# Patient Record
Sex: MALE | Race: WHITE | NOT HISPANIC OR LATINO | Employment: OTHER | URBAN - NONMETROPOLITAN AREA
[De-identification: names, ages, dates, MRNs, and addresses within clinical notes are randomized per-mention and may not be internally consistent; named-entity substitution may affect disease eponyms.]

---

## 2019-05-20 ENCOUNTER — APPOINTMENT (OUTPATIENT)
Dept: GENERAL RADIOLOGY | Facility: HOSPITAL | Age: 79
End: 2019-05-20

## 2019-05-20 ENCOUNTER — HOSPITAL ENCOUNTER (EMERGENCY)
Facility: HOSPITAL | Age: 79
Discharge: HOME | End: 2019-05-20
Attending: EMERGENCY MEDICINE | Admitting: EMERGENCY MEDICINE

## 2019-05-20 VITALS
RESPIRATION RATE: 20 BRPM | HEIGHT: 67 IN | HEART RATE: 100 BPM | DIASTOLIC BLOOD PRESSURE: 85 MMHG | SYSTOLIC BLOOD PRESSURE: 153 MMHG | BODY MASS INDEX: 25.9 KG/M2 | TEMPERATURE: 98.3 F | OXYGEN SATURATION: 95 % | WEIGHT: 165 LBS

## 2019-05-20 DIAGNOSIS — J44.1 COPD WITH ACUTE EXACERBATION (HCC): Primary | ICD-10-CM

## 2019-05-20 LAB
ALBUMIN SERPL-MCNC: 4.5 G/DL (ref 3.5–5)
ALBUMIN/GLOB SERPL: 1.4 G/DL (ref 1–2)
ALP SERPL-CCNC: 66 U/L (ref 38–126)
ALT SERPL W P-5'-P-CCNC: 29 U/L (ref 13–69)
ANION GAP SERPL CALCULATED.3IONS-SCNC: 15.5 MMOL/L (ref 10–20)
AST SERPL-CCNC: 35 U/L (ref 15–46)
BASOPHILS # BLD AUTO: 0.05 10*3/MM3 (ref 0–0.2)
BASOPHILS NFR BLD AUTO: 0.6 % (ref 0–1.5)
BILIRUB SERPL-MCNC: 0.6 MG/DL (ref 0.2–1.3)
BUN BLD-MCNC: 21 MG/DL (ref 7–20)
BUN/CREAT SERPL: 16.2 (ref 6.3–21.9)
CALCIUM SPEC-SCNC: 8.6 MG/DL (ref 8.4–10.2)
CHLORIDE SERPL-SCNC: 95 MMOL/L (ref 98–107)
CO2 SERPL-SCNC: 27 MMOL/L (ref 26–30)
CREAT BLD-MCNC: 1.3 MG/DL (ref 0.6–1.3)
DEPRECATED RDW RBC AUTO: 42.7 FL (ref 37–54)
EOSINOPHIL # BLD AUTO: 0.22 10*3/MM3 (ref 0–0.4)
EOSINOPHIL NFR BLD AUTO: 2.6 % (ref 0.3–6.2)
ERYTHROCYTE [DISTWIDTH] IN BLOOD BY AUTOMATED COUNT: 12.6 % (ref 12.3–15.4)
GFR SERPL CREATININE-BSD FRML MDRD: 53 ML/MIN/1.73
GLOBULIN UR ELPH-MCNC: 3.3 GM/DL
GLUCOSE BLD-MCNC: 116 MG/DL (ref 74–98)
HCT VFR BLD AUTO: 35.1 % (ref 37.5–51)
HGB BLD-MCNC: 12 G/DL (ref 13–17.7)
HOLD SPECIMEN: NORMAL
IMM GRANULOCYTES # BLD AUTO: 0.04 10*3/MM3 (ref 0–0.05)
IMM GRANULOCYTES NFR BLD AUTO: 0.5 % (ref 0–0.5)
LYMPHOCYTES # BLD AUTO: 0.74 10*3/MM3 (ref 0.7–3.1)
LYMPHOCYTES NFR BLD AUTO: 8.8 % (ref 19.6–45.3)
MCH RBC QN AUTO: 31.7 PG (ref 26.6–33)
MCHC RBC AUTO-ENTMCNC: 34.2 G/DL (ref 31.5–35.7)
MCV RBC AUTO: 92.9 FL (ref 79–97)
MONOCYTES # BLD AUTO: 0.79 10*3/MM3 (ref 0.1–0.9)
MONOCYTES NFR BLD AUTO: 9.4 % (ref 5–12)
NEUTROPHILS # BLD AUTO: 6.56 10*3/MM3 (ref 1.7–7)
NEUTROPHILS NFR BLD AUTO: 78.1 % (ref 42.7–76)
NRBC BLD AUTO-RTO: 0 /100 WBC (ref 0–0.2)
NT-PROBNP SERPL-MCNC: 332 PG/ML (ref 0–450)
PLATELET # BLD AUTO: 204 10*3/MM3 (ref 140–450)
PMV BLD AUTO: 8.2 FL (ref 6–12)
POTASSIUM BLD-SCNC: 4.5 MMOL/L (ref 3.5–5.1)
PROT SERPL-MCNC: 7.8 G/DL (ref 6.3–8.2)
RBC # BLD AUTO: 3.78 10*6/MM3 (ref 4.14–5.8)
SODIUM BLD-SCNC: 133 MMOL/L (ref 137–145)
TROPONIN I SERPL-MCNC: <0.012 NG/ML (ref 0–0.03)
WBC NRBC COR # BLD: 8.4 10*3/MM3 (ref 3.4–10.8)
WHOLE BLOOD HOLD SPECIMEN: NORMAL
WHOLE BLOOD HOLD SPECIMEN: NORMAL

## 2019-05-20 PROCEDURE — 80053 COMPREHEN METABOLIC PANEL: CPT

## 2019-05-20 PROCEDURE — 96375 TX/PRO/DX INJ NEW DRUG ADDON: CPT

## 2019-05-20 PROCEDURE — 93005 ELECTROCARDIOGRAM TRACING: CPT | Performed by: EMERGENCY MEDICINE

## 2019-05-20 PROCEDURE — 83880 ASSAY OF NATRIURETIC PEPTIDE: CPT

## 2019-05-20 PROCEDURE — 71046 X-RAY EXAM CHEST 2 VIEWS: CPT

## 2019-05-20 PROCEDURE — 25010000002 METHYLPREDNISOLONE PER 125 MG: Performed by: EMERGENCY MEDICINE

## 2019-05-20 PROCEDURE — 96365 THER/PROPH/DIAG IV INF INIT: CPT

## 2019-05-20 PROCEDURE — 99284 EMERGENCY DEPT VISIT MOD MDM: CPT

## 2019-05-20 PROCEDURE — 84484 ASSAY OF TROPONIN QUANT: CPT

## 2019-05-20 PROCEDURE — 94799 UNLISTED PULMONARY SVC/PX: CPT

## 2019-05-20 PROCEDURE — 94640 AIRWAY INHALATION TREATMENT: CPT

## 2019-05-20 PROCEDURE — 85025 COMPLETE CBC W/AUTO DIFF WBC: CPT

## 2019-05-20 PROCEDURE — 93005 ELECTROCARDIOGRAM TRACING: CPT

## 2019-05-20 PROCEDURE — 25010000002 MAGNESIUM SULFATE 2 GM/50ML SOLUTION: Performed by: EMERGENCY MEDICINE

## 2019-05-20 RX ORDER — SODIUM CHLORIDE 0.9 % (FLUSH) 0.9 %
10 SYRINGE (ML) INJECTION AS NEEDED
Status: DISCONTINUED | OUTPATIENT
Start: 2019-05-20 | End: 2019-05-20 | Stop reason: HOSPADM

## 2019-05-20 RX ORDER — MAGNESIUM SULFATE HEPTAHYDRATE 40 MG/ML
2 INJECTION, SOLUTION INTRAVENOUS ONCE
Status: COMPLETED | OUTPATIENT
Start: 2019-05-20 | End: 2019-05-20

## 2019-05-20 RX ORDER — IPRATROPIUM BROMIDE AND ALBUTEROL SULFATE 2.5; .5 MG/3ML; MG/3ML
9 SOLUTION RESPIRATORY (INHALATION) ONCE
Status: COMPLETED | OUTPATIENT
Start: 2019-05-20 | End: 2019-05-20

## 2019-05-20 RX ORDER — PREDNISONE 20 MG/1
60 TABLET ORAL DAILY
Qty: 15 TABLET | Refills: 0 | Status: SHIPPED | OUTPATIENT
Start: 2019-05-20 | End: 2019-05-25

## 2019-05-20 RX ORDER — METHYLPREDNISOLONE SODIUM SUCCINATE 125 MG/2ML
125 INJECTION, POWDER, LYOPHILIZED, FOR SOLUTION INTRAMUSCULAR; INTRAVENOUS ONCE
Status: COMPLETED | OUTPATIENT
Start: 2019-05-20 | End: 2019-05-20

## 2019-05-20 RX ORDER — DOXYCYCLINE 100 MG/1
100 CAPSULE ORAL 2 TIMES DAILY
Qty: 14 CAPSULE | Refills: 0 | Status: SHIPPED | OUTPATIENT
Start: 2019-05-20

## 2019-05-20 RX ADMIN — METHYLPREDNISOLONE SODIUM SUCCINATE 125 MG: 125 INJECTION, POWDER, FOR SOLUTION INTRAMUSCULAR; INTRAVENOUS at 13:52

## 2019-05-20 RX ADMIN — IPRATROPIUM BROMIDE AND ALBUTEROL SULFATE 9 ML: .5; 3 SOLUTION RESPIRATORY (INHALATION) at 13:27

## 2019-05-20 RX ADMIN — MAGNESIUM SULFATE HEPTAHYDRATE 2 G: 40 INJECTION, SOLUTION INTRAVENOUS at 13:54

## 2023-08-20 ENCOUNTER — HOSPITAL ENCOUNTER (EMERGENCY)
Facility: HOSPITAL | Age: 83
Discharge: HOME | End: 2023-08-20
Attending: EMERGENCY MEDICINE | Admitting: EMERGENCY MEDICINE
Payer: MEDICARE

## 2023-08-20 ENCOUNTER — APPOINTMENT (OUTPATIENT)
Dept: GENERAL RADIOLOGY | Facility: HOSPITAL | Age: 83
End: 2023-08-20
Payer: MEDICARE

## 2023-08-20 VITALS
WEIGHT: 165 LBS | HEIGHT: 67 IN | DIASTOLIC BLOOD PRESSURE: 86 MMHG | RESPIRATION RATE: 20 BRPM | BODY MASS INDEX: 25.9 KG/M2 | OXYGEN SATURATION: 100 % | HEART RATE: 67 BPM | SYSTOLIC BLOOD PRESSURE: 137 MMHG | TEMPERATURE: 97.9 F

## 2023-08-20 DIAGNOSIS — R63.0 DECREASED APPETITE: Primary | ICD-10-CM

## 2023-08-20 LAB
ALBUMIN SERPL-MCNC: 4.2 G/DL (ref 3.5–5.2)
ALBUMIN/GLOB SERPL: 1.4 G/DL
ALP SERPL-CCNC: 71 U/L (ref 39–117)
ALT SERPL W P-5'-P-CCNC: 11 U/L (ref 1–41)
ANION GAP SERPL CALCULATED.3IONS-SCNC: 7.6 MMOL/L (ref 5–15)
AST SERPL-CCNC: 15 U/L (ref 1–40)
BACTERIA UR QL AUTO: ABNORMAL /HPF
BASOPHILS # BLD AUTO: 0.07 10*3/MM3 (ref 0–0.2)
BASOPHILS NFR BLD AUTO: 1.3 % (ref 0–1.5)
BILIRUB SERPL-MCNC: 0.2 MG/DL (ref 0–1.2)
BILIRUB UR QL STRIP: NEGATIVE
BUN SERPL-MCNC: 28 MG/DL (ref 8–23)
BUN/CREAT SERPL: 23.1 (ref 7–25)
CALCIUM SPEC-SCNC: 9.3 MG/DL (ref 8.6–10.5)
CHLORIDE SERPL-SCNC: 101 MMOL/L (ref 98–107)
CLARITY UR: CLEAR
CLUMPED PLATELETS: PRESENT
CO2 SERPL-SCNC: 35.4 MMOL/L (ref 22–29)
COLOR UR: YELLOW
CREAT SERPL-MCNC: 1.21 MG/DL (ref 0.76–1.27)
DEPRECATED RDW RBC AUTO: 43.9 FL (ref 37–54)
EGFRCR SERPLBLD CKD-EPI 2021: 58.7 ML/MIN/1.73
EOSINOPHIL # BLD AUTO: 0.44 10*3/MM3 (ref 0–0.4)
EOSINOPHIL NFR BLD AUTO: 7.9 % (ref 0.3–6.2)
ERYTHROCYTE [DISTWIDTH] IN BLOOD BY AUTOMATED COUNT: 12.1 % (ref 12.3–15.4)
GLOBULIN UR ELPH-MCNC: 3.1 GM/DL
GLUCOSE SERPL-MCNC: 90 MG/DL (ref 65–99)
GLUCOSE UR STRIP-MCNC: NEGATIVE MG/DL
HCT VFR BLD AUTO: 34.8 % (ref 37.5–51)
HGB BLD-MCNC: 11.1 G/DL (ref 13–17.7)
HGB UR QL STRIP.AUTO: ABNORMAL
HOLD SPECIMEN: NORMAL
HOLD SPECIMEN: NORMAL
HYALINE CASTS UR QL AUTO: ABNORMAL /LPF
IMM GRANULOCYTES # BLD AUTO: 0.01 10*3/MM3 (ref 0–0.05)
IMM GRANULOCYTES NFR BLD AUTO: 0.2 % (ref 0–0.5)
KETONES UR QL STRIP: NEGATIVE
LEUKOCYTE ESTERASE UR QL STRIP.AUTO: NEGATIVE
LYMPHOCYTES # BLD AUTO: 1.18 10*3/MM3 (ref 0.7–3.1)
LYMPHOCYTES NFR BLD AUTO: 21.1 % (ref 19.6–45.3)
MAGNESIUM SERPL-MCNC: 2.3 MG/DL (ref 1.6–2.4)
MCH RBC QN AUTO: 31.2 PG (ref 26.6–33)
MCHC RBC AUTO-ENTMCNC: 31.9 G/DL (ref 31.5–35.7)
MCV RBC AUTO: 97.8 FL (ref 79–97)
MONOCYTES # BLD AUTO: 0.47 10*3/MM3 (ref 0.1–0.9)
MONOCYTES NFR BLD AUTO: 8.4 % (ref 5–12)
NEUTROPHILS NFR BLD AUTO: 3.43 10*3/MM3 (ref 1.7–7)
NEUTROPHILS NFR BLD AUTO: 61.1 % (ref 42.7–76)
NITRITE UR QL STRIP: NEGATIVE
NRBC BLD AUTO-RTO: 0 /100 WBC (ref 0–0.2)
PH UR STRIP.AUTO: 5.5 [PH] (ref 5–8)
PLATELET # BLD AUTO: 212 10*3/MM3 (ref 140–450)
PMV BLD AUTO: 8.7 FL (ref 6–12)
POTASSIUM SERPL-SCNC: 4.5 MMOL/L (ref 3.5–5.2)
PROT SERPL-MCNC: 7.3 G/DL (ref 6–8.5)
PROT UR QL STRIP: ABNORMAL
RBC # BLD AUTO: 3.56 10*6/MM3 (ref 4.14–5.8)
RBC # UR STRIP: ABNORMAL /HPF
RBC MORPH BLD: NORMAL
REF LAB TEST METHOD: ABNORMAL
SODIUM SERPL-SCNC: 144 MMOL/L (ref 136–145)
SP GR UR STRIP: 1.02 (ref 1–1.03)
SQUAMOUS #/AREA URNS HPF: ABNORMAL /HPF
TROPONIN T SERPL HS-MCNC: 11 NG/L
UROBILINOGEN UR QL STRIP: ABNORMAL
WBC # UR STRIP: ABNORMAL /HPF
WBC MORPH BLD: NORMAL
WBC NRBC COR # BLD: 5.6 10*3/MM3 (ref 3.4–10.8)
WHOLE BLOOD HOLD COAG: NORMAL
WHOLE BLOOD HOLD SPECIMEN: NORMAL

## 2023-08-20 PROCEDURE — 84484 ASSAY OF TROPONIN QUANT: CPT | Performed by: EMERGENCY MEDICINE

## 2023-08-20 PROCEDURE — 71045 X-RAY EXAM CHEST 1 VIEW: CPT

## 2023-08-20 PROCEDURE — 81001 URINALYSIS AUTO W/SCOPE: CPT | Performed by: EMERGENCY MEDICINE

## 2023-08-20 PROCEDURE — 85025 COMPLETE CBC W/AUTO DIFF WBC: CPT | Performed by: EMERGENCY MEDICINE

## 2023-08-20 PROCEDURE — 99284 EMERGENCY DEPT VISIT MOD MDM: CPT

## 2023-08-20 PROCEDURE — 85007 BL SMEAR W/DIFF WBC COUNT: CPT | Performed by: EMERGENCY MEDICINE

## 2023-08-20 PROCEDURE — 83735 ASSAY OF MAGNESIUM: CPT | Performed by: EMERGENCY MEDICINE

## 2023-08-20 PROCEDURE — P9612 CATHETERIZE FOR URINE SPEC: HCPCS

## 2023-08-20 PROCEDURE — 80053 COMPREHEN METABOLIC PANEL: CPT | Performed by: EMERGENCY MEDICINE

## 2023-08-20 RX ORDER — SODIUM CHLORIDE 0.9 % (FLUSH) 0.9 %
10 SYRINGE (ML) INJECTION AS NEEDED
Status: DISCONTINUED | OUTPATIENT
Start: 2023-08-20 | End: 2023-08-20 | Stop reason: HOSPADM

## 2023-08-20 RX ADMIN — SODIUM CHLORIDE 500 ML: 9 INJECTION, SOLUTION INTRAVENOUS at 16:17

## 2023-08-20 NOTE — DISCHARGE INSTRUCTIONS
Try to eat well-balanced meals throughout the day, try to encourage p.o. intake with deutsch, juices, etc.  Continue home medications as directed including inhalers.  Try to follow-up with primary care provider in the next 24 to 48 hours.  Return to the ER for any change, worsening of symptoms, or any additional concerns including but not limited to fever greater than 100.4, intractable vomiting, diarrhea, melena, hematochezia, shortness of breath, chest pain, dizziness, syncope.

## 2023-08-20 NOTE — ED PROVIDER NOTES
Subjective  History of Present Illness:    Chief Complaint: Dehydration   History of Present Illness: Patient is an 83-year-old male with history of COPD on nasal cannula presenting to the ER for possible dehydration.  When asked  what brings patient to the hospital today he states he is unsure and we would have to ask his wife.  He states he feels fine and denies any symptoms.  Per EMS, patient's wife states he has not been himself for the past few weeks and has not been eating or drinking.  They also have noted foul-smelling urine.  Patient denies any known fever, chills, worsening shortness of breath, productive cough, chest pain, dizziness, syncope, abdominal pain, dysuria, hematuria, melena, hematochezia, or any other symptoms.  Patient's daughter at bedside states patient really does not like to drink water, drinks a lot of coffee and her mother was concerned that he was getting dehydrated.  Onset: Days to weeks   Duration: Persistent  Exacerbating / Alleviating factors: None  Associated symptoms: None      Nurses Notes reviewed and agree, including vitals, allergies, social history and prior medical history.     REVIEW OF SYSTEMS: All systems reviewed and not pertinent unless noted.  Review of Systems      Positive for: Decreased appetite    Negative for: Fever, chills, shortness of breath, chest pain, cough, dizziness, syncope, abdominal pain, emesis, diarrhea, dysuria    Past Medical History:   Diagnosis Date    COPD (chronic obstructive pulmonary disease)        Allergies:    Patient has no known allergies.      Past Surgical History:   Procedure Laterality Date    HERNIA REPAIR      TONSILLECTOMY           Social History     Socioeconomic History    Marital status:    Tobacco Use    Smoking status: Former     Types: Cigarettes     Quit date:      Years since quittin.6   Substance and Sexual Activity    Alcohol use: No    Drug use: No         History reviewed. No pertinent family  "history.    Objective  Physical Exam:  /86   Pulse 67   Temp 97.9 øF (36.6 øC) (Oral)   Resp 20   Ht 170.2 cm (67\")   Wt 74.8 kg (165 lb)   SpO2 100%   BMI 25.84 kg/mý      Physical Exam    CONSTITUTIONAL: Well developed, appears chronically ill and cachectic.  He is awake, alert, answering questions.  He is oriented to person and place, does think that it is 2022  VITAL SIGNS: per nursing, reviewed and noted  SKIN: exposed skin with no rashes, ulcerations or petechiae  EYES: Grossly EOMI, no icterus, PERRL  ENT: Normal voice.  No obvious facial asymmetry, nares patent, tongue midline  RESPIRATORY:  No increased work of breathing. No retractions.  Poor lung sounds bilaterally, no significant wheezing or stridor  CARDIOVASCULAR:  regular rate, distal pulses intact  GI: Abdomen soft, nontender  MUSCULOSKELETAL:  No tenderness. Full ROM. Strength and tone grossly normal.  no spasms.   NEUROLOGIC: Alert, oriented x 3. No gross deficits. GCS 15.   PSYCH: appropriate affect.      Procedures    ED Course:        ED Course as of 08/21/23 0002   Sun Aug 20, 2023   1628 EKG interpreted by me reveals sinus rhythm with rate of 83 bpm.  There is sinus arrhythmia.  No acute ischemic findings.  This is a normal-appearing EKG. [TB]   1706 Magnesium: 2.3 [LR]   1706 Glucose: 90 [LR]   1706 BUN(!): 28 [LR]   1706 Creatinine: 1.21 [LR]   1706 Sodium: 144 [LR]   1706 Potassium: 4.5 [LR]   1706 CO2(!): 35.4 [LR]   1706 Calcium: 9.3 [LR]   1706 Total Protein: 7.3 [LR]   1706 Albumin: 4.2 [LR]   1706 ALT (SGPT): 11 [LR]   1706 AST (SGOT): 15 [LR]   1706 Alkaline Phosphatase: 71 [LR]   1706 Total Bilirubin: 0.2 [LR]   1706 Globulin: 3.1 [LR]   1706 A/G Ratio: 1.4 [LR]   1706 BUN/Creatinine Ratio: 23.1 [LR]   1706 Anion Gap: 7.6 [LR]   1706 eGFR(!): 58.7 [LR]   1706 HS Troponin T: 11 [LR]   1706 Color, UA: Yellow [LR]   1706 Appearance, UA: Clear [LR]   1706 pH, UA: 5.5 [LR]   1706 Specific Gravity, UA: 1.024 [LR]   1706 " Glucose: Negative [LR]   1706 Ketones, UA: Negative [LR]   1706 Bilirubin, UA: Negative [LR]   1706 Blood, UA(!): Trace [LR]   1706 Protein, UA(!): 100 mg/dL (2+) [LR]   1706 Leukocytes, UA: Negative [LR]   1706 Nitrite, UA: Negative [LR]   1706 Urobilinogen, UA: 1.0 E.U./dL [LR]   1706 Magnesium: 2.3 [LR]   1718 RBC, UA(!): 0-2 [LR]   1718 WBC, UA: None Seen [LR]   1718 Bacteria, UA: None Seen [LR]   1718 Squamous Epithelial Cells, UA: 0-2 [LR]   1718 Hyaline Casts, UA: 3-6 [LR]   1718 Methodology:: Manual Light Microscopy [LR]   1755 Discussed findings with patient and daughter at bedside.  He is resting comfortably in no distress.  Believe he can be discharged at this time with close follow-up and strict return precautions. [LR]      ED Course User Index  [LR] Estella Keller PA-C  [TB] Phuong Hart MD       Lab Results (last 24 hours)       Procedure Component Value Units Date/Time    CBC & Differential [[id number]]  (Abnormal) Collected: 08/20/23 1556    Specimen: Blood Updated: 08/20/23 1604    Narrative:      The following orders were created for panel order CBC & Differential.  Procedure                               Abnormality         Status                     ---------                               -----------         ------                     CBC Auto Differential[[id number]]        Abnormal            Final result               Scan Slide[[id number]]                                       Final result                 Please view results for these tests on the individual orders.    Comprehensive Metabolic Panel [[id number]]  (Abnormal) Collected: 08/20/23 1556    Specimen: Blood Updated: 08/20/23 1622     Glucose 90 mg/dL      BUN 28 mg/dL      Creatinine 1.21 mg/dL      Sodium 144 mmol/L      Potassium 4.5 mmol/L      Chloride 101 mmol/L      CO2 35.4 mmol/L      Calcium 9.3 mg/dL      Total Protein 7.3 g/dL      Albumin 4.2 g/dL      ALT (SGPT) 11 U/L      AST (SGOT) 15 U/L      Alkaline  Phosphatase 71 U/L      Total Bilirubin 0.2 mg/dL      Globulin 3.1 gm/dL      A/G Ratio 1.4 g/dL      BUN/Creatinine Ratio 23.1     Anion Gap 7.6 mmol/L      eGFR 58.7 mL/min/1.73     Narrative:      GFR Normal >60  Chronic Kidney Disease <60  Kidney Failure <15    The GFR formula is only valid for adults with stable renal function between ages 18 and 70.    Single High Sensitivity Troponin T [[id number]]  (Normal) Collected: 08/20/23 1556    Specimen: Blood Updated: 08/20/23 1622     HS Troponin T 11 ng/L     Narrative:      High Sensitive Troponin T Reference Range:  <10.0 ng/L- Negative Female for AMI  <15.0 ng/L- Negative Male for AMI  >=10 - Abnormal Female indicating possible myocardial injury.  >=15 - Abnormal Male indicating possible myocardial injury.   Clinicians would have to utilize clinical acumen, EKG, Troponin, and serial changes to determine if it is an Acute Myocardial Infarction or myocardial injury due to an underlying chronic condition.         Magnesium [[id number]]  (Normal) Collected: 08/20/23 1556    Specimen: Blood Updated: 08/20/23 1622     Magnesium 2.3 mg/dL     CBC Auto Differential [[id number]]  (Abnormal) Collected: 08/20/23 1556    Specimen: Blood Updated: 08/20/23 1604     WBC 5.60 10*3/mm3      RBC 3.56 10*6/mm3      Hemoglobin 11.1 g/dL      Hematocrit 34.8 %      MCV 97.8 fL      MCH 31.2 pg      MCHC 31.9 g/dL      RDW 12.1 %      RDW-SD 43.9 fl      MPV 8.7 fL      Platelets 212 10*3/mm3      Neutrophil % 61.1 %      Lymphocyte % 21.1 %      Monocyte % 8.4 %      Eosinophil % 7.9 %      Basophil % 1.3 %      Immature Grans % 0.2 %      Neutrophils, Absolute 3.43 10*3/mm3      Lymphocytes, Absolute 1.18 10*3/mm3      Monocytes, Absolute 0.47 10*3/mm3      Eosinophils, Absolute 0.44 10*3/mm3      Basophils, Absolute 0.07 10*3/mm3      Immature Grans, Absolute 0.01 10*3/mm3      nRBC 0.0 /100 WBC     Scan Slide [[id number]] Collected: 08/20/23 1556    Specimen: Blood Updated: 08/20/23  1604     RBC Morphology Normal     WBC Morphology Normal     Clumped Platelets Present    Urinalysis With Microscopic If Indicated (No Culture) - Straight Cath [[id number]]  (Abnormal) Collected: 08/20/23 1658    Specimen: Urine from Straight Cath Updated: 08/20/23 1704     Color, UA Yellow     Appearance, UA Clear     pH, UA 5.5     Specific Gravity, UA 1.024     Glucose, UA Negative     Ketones, UA Negative     Bilirubin, UA Negative     Blood, UA Trace     Protein,  mg/dL (2+)     Leuk Esterase, UA Negative     Nitrite, UA Negative     Urobilinogen, UA 1.0 E.U./dL    Urinalysis, Microscopic Only - Straight Cath [[id number]]  (Abnormal) Collected: 08/20/23 1658    Specimen: Urine from Straight Cath Updated: 08/20/23 1712     RBC, UA 0-2 /HPF      WBC, UA None Seen /HPF      Bacteria, UA None Seen /HPF      Squamous Epithelial Cells, UA 0-2 /HPF      Hyaline Casts, UA 3-6 /LPF      Methodology Manual Light Microscopy             No radiology results from the last 24 hrs       MDM     Amount and/or Complexity of Data Reviewed  Clinical lab tests: reviewed  Tests in the radiology section of [facility]: reviewed  Decide to obtain previous medical records or to obtain history from someone other than the patient: yes  Independent visualization of images, tracings, or specimens: yes        Initial impression of presenting illness: Patient is an 83-year-old male presenting to the ER for evaluation of concern for dehydration.  Patient denies any symptoms but EMS states his wife said he has not been eating or drinking lately    DDX: includes but is not limited to: Dehydration, electrolyte abnormalities, underlying infection such as UTI.  He denies any shortness of breath, chest pain.  He denies any abdominal pain.    Patient arrives in overall stable condition with vitals interpreted by myself.     Pertinent features from physical exam: Patient appears cachectic, nasal cannula in place, speaking in short sentences, no  abdominal tenderness, afebrile, vital signs within normal limits.    Initial diagnostic plan: We will obtain basic labs, lipase, troponin, magnesium, chest x-ray, urinalysis.  Will give IV fluids.    Results from initial plan were reviewed and interpreted by me revealing overall stable work-up.  No signs of infectious process.  There is no elevation of troponin.  Kidney function within normal limits.  No elevation no significant focal cardiopulmonary abnormalities noted on chest x-ray.  EKG interpreted by the attending.  No leukocytosis, hemoglobin 11.1, most recent for comparison was over 4 years ago.  Urine without signs of infection.    Diagnostic information from other sources: Chart review, patient's daughter    Interventions / Re-evaluation: Patient remained stable throughout visit.  He had no complaints throughout his stay here.  He received 500 cc fluid bolus, vital signs remained stable    Results/clinical rationale were discussed with patient and daughter at bedside.  Discussed work-up and findings.  Discussed making sure he drinks plenty of fluids at home and eats well-balanced meals throughout the day.  Discussed follow-up with primary care provider and his pulmonologist.  Discussed strict return precautions to the ER.  He verbalized understanding and was in agreement with this plan of care.    Consultations/Discussion of results with other physicians: Dr. Hart    Disposition plan: Discharge  -----    Final diagnoses:   Decreased appetite          Estella Keller PA-C  08/21/23 0002

## 2024-05-12 ENCOUNTER — APPOINTMENT (OUTPATIENT)
Dept: GENERAL RADIOLOGY | Facility: HOSPITAL | Age: 84
End: 2024-05-12
Payer: MEDICARE

## 2024-05-12 ENCOUNTER — APPOINTMENT (OUTPATIENT)
Dept: CT IMAGING | Facility: HOSPITAL | Age: 84
End: 2024-05-12
Payer: MEDICARE

## 2024-05-12 ENCOUNTER — HOSPITAL ENCOUNTER (INPATIENT)
Facility: HOSPITAL | Age: 84
LOS: 3 days | Discharge: NURSING, REHAB OR LONG-TERM CARE | End: 2024-05-15
Attending: STUDENT IN AN ORGANIZED HEALTH CARE EDUCATION/TRAINING PROGRAM | Admitting: STUDENT IN AN ORGANIZED HEALTH CARE EDUCATION/TRAINING PROGRAM
Payer: MEDICARE

## 2024-05-12 DIAGNOSIS — J43.9 PULMONARY EMPHYSEMA, UNSPECIFIED EMPHYSEMA TYPE: ICD-10-CM

## 2024-05-12 DIAGNOSIS — J18.0 BRONCHOPNEUMONIA: Primary | ICD-10-CM

## 2024-05-12 DIAGNOSIS — J96.01 ACUTE HYPOXIC RESPIRATORY FAILURE: ICD-10-CM

## 2024-05-12 LAB
A-A DO2: ABNORMAL
ALBUMIN SERPL-MCNC: 3.7 G/DL (ref 3.5–5.2)
ALBUMIN/GLOB SERPL: 1.1 G/DL
ALP SERPL-CCNC: 75 U/L (ref 39–117)
ALT SERPL W P-5'-P-CCNC: 20 U/L (ref 1–41)
ANION GAP SERPL CALCULATED.3IONS-SCNC: 10 MMOL/L (ref 5–15)
ARTERIAL PATENCY WRIST A: POSITIVE
AST SERPL-CCNC: 18 U/L (ref 1–40)
ATMOSPHERIC PRESS: 732 MMHG
BASE EXCESS BLDA CALC-SCNC: 7.3 MMOL/L (ref 0–2)
BASOPHILS # BLD AUTO: 0.04 10*3/MM3 (ref 0–0.2)
BASOPHILS NFR BLD AUTO: 0.5 % (ref 0–1.5)
BDY SITE: ABNORMAL
BILIRUB SERPL-MCNC: 0.4 MG/DL (ref 0–1.2)
BUN SERPL-MCNC: 25 MG/DL (ref 8–23)
BUN/CREAT SERPL: 20.2 (ref 7–25)
CALCIUM SPEC-SCNC: 9.1 MG/DL (ref 8.6–10.5)
CHLORIDE SERPL-SCNC: 98 MMOL/L (ref 98–107)
CO2 SERPL-SCNC: 31 MMOL/L (ref 22–29)
COHGB MFR BLD: 0.9 % (ref 0–2)
CREAT SERPL-MCNC: 1.24 MG/DL (ref 0.76–1.27)
D-LACTATE SERPL-SCNC: 1.1 MMOL/L (ref 0.5–2)
DEPRECATED RDW RBC AUTO: 45.1 FL (ref 37–54)
EGFRCR SERPLBLD CKD-EPI 2021: 56.6 ML/MIN/1.73
EOSINOPHIL # BLD AUTO: 0.07 10*3/MM3 (ref 0–0.4)
EOSINOPHIL NFR BLD AUTO: 0.9 % (ref 0.3–6.2)
ERYTHROCYTE [DISTWIDTH] IN BLOOD BY AUTOMATED COUNT: 12.5 % (ref 12.3–15.4)
GAS FLOW AIRWAY: 6 LPM
GEN 5 2HR TROPONIN T REFLEX: 16 NG/L
GLOBULIN UR ELPH-MCNC: 3.3 GM/DL
GLUCOSE SERPL-MCNC: 130 MG/DL (ref 65–99)
HCO3 BLDA-SCNC: 34.1 MMOL/L (ref 22–28)
HCT VFR BLD AUTO: 32.1 % (ref 37.5–51)
HCT VFR BLD CALC: 31.3 %
HGB BLD-MCNC: 10 G/DL (ref 13–17.7)
HOLD SPECIMEN: NORMAL
HOLD SPECIMEN: NORMAL
IMM GRANULOCYTES # BLD AUTO: 0.02 10*3/MM3 (ref 0–0.05)
IMM GRANULOCYTES NFR BLD AUTO: 0.3 % (ref 0–0.5)
LYMPHOCYTES # BLD AUTO: 0.75 10*3/MM3 (ref 0.7–3.1)
LYMPHOCYTES NFR BLD AUTO: 9.5 % (ref 19.6–45.3)
Lab: ABNORMAL
MAGNESIUM SERPL-MCNC: 2.2 MG/DL (ref 1.6–2.4)
MCH RBC QN AUTO: 30.8 PG (ref 26.6–33)
MCHC RBC AUTO-ENTMCNC: 31.2 G/DL (ref 31.5–35.7)
MCV RBC AUTO: 98.8 FL (ref 79–97)
METHGB BLD QL: 0.5 % (ref 0–1.5)
MODALITY: ABNORMAL
MONOCYTES # BLD AUTO: 0.8 10*3/MM3 (ref 0.1–0.9)
MONOCYTES NFR BLD AUTO: 10.2 % (ref 5–12)
NEUTROPHILS NFR BLD AUTO: 6.19 10*3/MM3 (ref 1.7–7)
NEUTROPHILS NFR BLD AUTO: 78.6 % (ref 42.7–76)
NRBC BLD AUTO-RTO: 0 /100 WBC (ref 0–0.2)
NT-PROBNP SERPL-MCNC: 322.3 PG/ML (ref 0–1800)
OXYHGB MFR BLDV: 95.8 % (ref 94–99)
PCO2 BLDA: 59.4 MM HG (ref 35–45)
PCO2 TEMP ADJ BLD: ABNORMAL MM[HG]
PH BLDA: 7.37 PH UNITS (ref 7.35–7.45)
PH, TEMP CORRECTED: ABNORMAL
PLATELET # BLD AUTO: 182 10*3/MM3 (ref 140–450)
PMV BLD AUTO: 8.5 FL (ref 6–12)
PO2 BLDA: 91.4 MM HG (ref 75–100)
PO2 TEMP ADJ BLD: ABNORMAL MM[HG]
POTASSIUM SERPL-SCNC: 4.5 MMOL/L (ref 3.5–5.2)
PROCALCITONIN SERPL-MCNC: 0.15 NG/ML (ref 0–0.25)
PROT SERPL-MCNC: 7 G/DL (ref 6–8.5)
RBC # BLD AUTO: 3.25 10*6/MM3 (ref 4.14–5.8)
SAO2 % BLDCOA: 97.1 % (ref 94–100)
SODIUM SERPL-SCNC: 139 MMOL/L (ref 136–145)
TROPONIN T DELTA: 0 NG/L
TROPONIN T SERPL HS-MCNC: 16 NG/L
TROPONIN T SERPL HS-MCNC: 16 NG/L
VENTILATOR MODE: ABNORMAL
WBC NRBC COR # BLD AUTO: 7.87 10*3/MM3 (ref 3.4–10.8)
WHOLE BLOOD HOLD COAG: NORMAL
WHOLE BLOOD HOLD SPECIMEN: NORMAL

## 2024-05-12 PROCEDURE — 83880 ASSAY OF NATRIURETIC PEPTIDE: CPT | Performed by: STUDENT IN AN ORGANIZED HEALTH CARE EDUCATION/TRAINING PROGRAM

## 2024-05-12 PROCEDURE — 85025 COMPLETE CBC W/AUTO DIFF WBC: CPT | Performed by: STUDENT IN AN ORGANIZED HEALTH CARE EDUCATION/TRAINING PROGRAM

## 2024-05-12 PROCEDURE — 83605 ASSAY OF LACTIC ACID: CPT | Performed by: STUDENT IN AN ORGANIZED HEALTH CARE EDUCATION/TRAINING PROGRAM

## 2024-05-12 PROCEDURE — 99285 EMERGENCY DEPT VISIT HI MDM: CPT

## 2024-05-12 PROCEDURE — 84484 ASSAY OF TROPONIN QUANT: CPT | Performed by: STUDENT IN AN ORGANIZED HEALTH CARE EDUCATION/TRAINING PROGRAM

## 2024-05-12 PROCEDURE — 83050 HGB METHEMOGLOBIN QUAN: CPT

## 2024-05-12 PROCEDURE — 71275 CT ANGIOGRAPHY CHEST: CPT

## 2024-05-12 PROCEDURE — 25010000002 METHYLPREDNISOLONE PER 125 MG: Performed by: STUDENT IN AN ORGANIZED HEALTH CARE EDUCATION/TRAINING PROGRAM

## 2024-05-12 PROCEDURE — 36415 COLL VENOUS BLD VENIPUNCTURE: CPT

## 2024-05-12 PROCEDURE — 93005 ELECTROCARDIOGRAM TRACING: CPT | Performed by: STUDENT IN AN ORGANIZED HEALTH CARE EDUCATION/TRAINING PROGRAM

## 2024-05-12 PROCEDURE — 36600 WITHDRAWAL OF ARTERIAL BLOOD: CPT

## 2024-05-12 PROCEDURE — 71045 X-RAY EXAM CHEST 1 VIEW: CPT

## 2024-05-12 PROCEDURE — 83735 ASSAY OF MAGNESIUM: CPT

## 2024-05-12 PROCEDURE — 82375 ASSAY CARBOXYHB QUANT: CPT

## 2024-05-12 PROCEDURE — 94640 AIRWAY INHALATION TREATMENT: CPT

## 2024-05-12 PROCEDURE — 80053 COMPREHEN METABOLIC PANEL: CPT | Performed by: STUDENT IN AN ORGANIZED HEALTH CARE EDUCATION/TRAINING PROGRAM

## 2024-05-12 PROCEDURE — 25510000001 IOPAMIDOL 61 % SOLUTION: Performed by: STUDENT IN AN ORGANIZED HEALTH CARE EDUCATION/TRAINING PROGRAM

## 2024-05-12 PROCEDURE — 84145 PROCALCITONIN (PCT): CPT

## 2024-05-12 PROCEDURE — 82805 BLOOD GASES W/O2 SATURATION: CPT

## 2024-05-12 RX ORDER — METHYLPREDNISOLONE SODIUM SUCCINATE 125 MG/2ML
125 INJECTION, POWDER, LYOPHILIZED, FOR SOLUTION INTRAMUSCULAR; INTRAVENOUS ONCE
Status: COMPLETED | OUTPATIENT
Start: 2024-05-12 | End: 2024-05-12

## 2024-05-12 RX ORDER — ALBUTEROL SULFATE 2.5 MG/3ML
5 SOLUTION RESPIRATORY (INHALATION) ONCE
Status: COMPLETED | OUTPATIENT
Start: 2024-05-12 | End: 2024-05-12

## 2024-05-12 RX ORDER — SODIUM CHLORIDE 0.9 % (FLUSH) 0.9 %
10 SYRINGE (ML) INJECTION AS NEEDED
Status: DISCONTINUED | OUTPATIENT
Start: 2024-05-12 | End: 2024-05-13 | Stop reason: SDUPTHER

## 2024-05-12 RX ADMIN — IOPAMIDOL 100 ML: 612 INJECTION, SOLUTION INTRAVENOUS at 19:08

## 2024-05-12 RX ADMIN — IPRATROPIUM BROMIDE 1 MG: 0.5 SOLUTION RESPIRATORY (INHALATION) at 20:19

## 2024-05-12 RX ADMIN — METHYLPREDNISOLONE SODIUM SUCCINATE 125 MG: 125 INJECTION, POWDER, FOR SOLUTION INTRAMUSCULAR; INTRAVENOUS at 19:58

## 2024-05-12 RX ADMIN — ALBUTEROL SULFATE 5 MG: 2.5 SOLUTION RESPIRATORY (INHALATION) at 20:19

## 2024-05-12 NOTE — ED PROVIDER NOTES
EMERGENCY DEPARTMENT ENCOUNTER    Pt Name: Gregorio Yu  MRN: [id number]  Pt :   1940  Room Number:    Date of encounter:  2024  PCP: Xu Santos MD  ED Provider: Reynaldo Fontaine PA-C    Historian: Patient, nursing notes      HPI:  Chief Complaint: Shortness of breath        Context: Gregorio Yu is a 84 y.o. male who presents to the ED c/o increased shortness of breath for the last several days.  Patient denies any chest pain, hemoptysis, cough, fever or chills, abdominal pain, headache, dizziness, or any other complaint today.      PAST MEDICAL HISTORY  Past Medical History:   Diagnosis Date    COPD (chronic obstructive pulmonary disease)          PAST SURGICAL HISTORY  Past Surgical History:   Procedure Laterality Date    HERNIA REPAIR      TONSILLECTOMY           FAMILY HISTORY  History reviewed. No pertinent family history.      SOCIAL HISTORY  Social History     Socioeconomic History    Marital status:    Tobacco Use    Smoking status: Former     Current packs/day: 0.00     Types: Cigarettes     Quit date:      Years since quitting: 15.3   Substance and Sexual Activity    Alcohol use: No    Drug use: No         ALLERGIES  Patient has no known allergies.        REVIEW OF SYSTEMS  Review of Systems   Constitutional:  Negative for chills and fever.   HENT:  Negative for congestion and sore throat.    Respiratory:  Positive for shortness of breath and wheezing. Negative for cough.    Cardiovascular:  Negative for chest pain.   Gastrointestinal:  Negative for abdominal pain, nausea and vomiting.   Genitourinary:  Negative for dysuria.   Musculoskeletal:  Negative for back pain.   Skin:  Negative for wound.   Neurological:  Negative for dizziness, light-headedness and headaches.   Psychiatric/Behavioral:  Negative for confusion.    All other systems reviewed and are negative.         All systems reviewed and negative except for those discussed in HPI.       PHYSICAL  EXAM    I have reviewed the triage vital signs and nursing notes.    ED Triage Vitals   Temp Heart Rate Resp BP SpO2   05/12/24 1823 05/12/24 1823 05/12/24 1823 05/12/24 1824 05/12/24 1823   99.7 °F (37.6 °C) 115 20 146/77 93 %      Temp src Heart Rate Source Patient Position BP Location FiO2 (%)   05/12/24 1823 -- 05/12/24 1823 05/12/24 1823 --   Oral  Sitting Right arm        Physical Exam  Vitals and nursing note reviewed.   Constitutional:       General: He is not in acute distress.     Appearance: Normal appearance. He is not ill-appearing, toxic-appearing or diaphoretic.   HENT:      Head: Normocephalic and atraumatic.      Right Ear: External ear normal.      Left Ear: External ear normal.      Nose: No congestion or rhinorrhea.      Mouth/Throat:      Mouth: Mucous membranes are moist.      Pharynx: Oropharynx is clear.   Eyes:      Conjunctiva/sclera: Conjunctivae normal.   Cardiovascular:      Rate and Rhythm: Normal rate.      Heart sounds: Normal heart sounds.   Pulmonary:      Effort: Pulmonary effort is normal. No respiratory distress.      Breath sounds: Decreased breath sounds and wheezing present. No rhonchi or rales.   Abdominal:      Tenderness: There is no abdominal tenderness.   Musculoskeletal:      Cervical back: Normal range of motion and neck supple.      Right lower leg: No edema.      Left lower leg: No edema.   Skin:     Findings: No rash.   Neurological:      Mental Status: He is alert.             LAB RESULTS  Recent Results (from the past 24 hour(s))   High Sensitivity Troponin T    Collection Time: 05/12/24  6:28 PM    Specimen: Blood   Result Value Ref Range    HS Troponin T 16 <22 ng/L   Comprehensive Metabolic Panel    Collection Time: 05/12/24  6:28 PM    Specimen: Blood   Result Value Ref Range    Glucose 130 (H) 65 - 99 mg/dL    BUN 25 (H) 8 - 23 mg/dL    Creatinine 1.24 0.76 - 1.27 mg/dL    Sodium 139 136 - 145 mmol/L    Potassium 4.5 3.5 - 5.2 mmol/L    Chloride 98 98 - 107  mmol/L    CO2 31.0 (H) 22.0 - 29.0 mmol/L    Calcium 9.1 8.6 - 10.5 mg/dL    Total Protein 7.0 6.0 - 8.5 g/dL    Albumin 3.7 3.5 - 5.2 g/dL    ALT (SGPT) 20 1 - 41 U/L    AST (SGOT) 18 1 - 40 U/L    Alkaline Phosphatase 75 39 - 117 U/L    Total Bilirubin 0.4 0.0 - 1.2 mg/dL    Globulin 3.3 gm/dL    A/G Ratio 1.1 g/dL    BUN/Creatinine Ratio 20.2 7.0 - 25.0    Anion Gap 10.0 5.0 - 15.0 mmol/L    eGFR 56.6 (L) >60.0 mL/min/1.73   BNP    Collection Time: 05/12/24  6:28 PM    Specimen: Blood   Result Value Ref Range    proBNP 322.3 0.0 - 1,800.0 pg/mL   Single High Sensitivity Troponin T    Collection Time: 05/12/24  6:28 PM    Specimen: Blood   Result Value Ref Range    HS Troponin T 16 <22 ng/L   Green Top (Gel)    Collection Time: 05/12/24  6:28 PM   Result Value Ref Range    Extra Tube Hold for add-ons.    Lavender Top    Collection Time: 05/12/24  6:28 PM   Result Value Ref Range    Extra Tube hold for add-on    Gold Top - SST    Collection Time: 05/12/24  6:28 PM   Result Value Ref Range    Extra Tube Hold for add-ons.    Light Blue Top    Collection Time: 05/12/24  6:28 PM   Result Value Ref Range    Extra Tube Hold for add-ons.    CBC Auto Differential    Collection Time: 05/12/24  6:28 PM    Specimen: Blood   Result Value Ref Range    WBC 7.87 3.40 - 10.80 10*3/mm3    RBC 3.25 (L) 4.14 - 5.80 10*6/mm3    Hemoglobin 10.0 (L) 13.0 - 17.7 g/dL    Hematocrit 32.1 (L) 37.5 - 51.0 %    MCV 98.8 (H) 79.0 - 97.0 fL    MCH 30.8 26.6 - 33.0 pg    MCHC 31.2 (L) 31.5 - 35.7 g/dL    RDW 12.5 12.3 - 15.4 %    RDW-SD 45.1 37.0 - 54.0 fl    MPV 8.5 6.0 - 12.0 fL    Platelets 182 140 - 450 10*3/mm3    Neutrophil % 78.6 (H) 42.7 - 76.0 %    Lymphocyte % 9.5 (L) 19.6 - 45.3 %    Monocyte % 10.2 5.0 - 12.0 %    Eosinophil % 0.9 0.3 - 6.2 %    Basophil % 0.5 0.0 - 1.5 %    Immature Grans % 0.3 0.0 - 0.5 %    Neutrophils, Absolute 6.19 1.70 - 7.00 10*3/mm3    Lymphocytes, Absolute 0.75 0.70 - 3.10 10*3/mm3    Monocytes, Absolute  0.80 0.10 - 0.90 10*3/mm3    Eosinophils, Absolute 0.07 0.00 - 0.40 10*3/mm3    Basophils, Absolute 0.04 0.00 - 0.20 10*3/mm3    Immature Grans, Absolute 0.02 0.00 - 0.05 10*3/mm3    nRBC 0.0 0.0 - 0.2 /100 WBC   Magnesium    Collection Time: 05/12/24  6:28 PM    Specimen: Blood   Result Value Ref Range    Magnesium 2.2 1.6 - 2.4 mg/dL   Procalcitonin    Collection Time: 05/12/24  6:28 PM    Specimen: Blood   Result Value Ref Range    Procalcitonin 0.15 0.00 - 0.25 ng/mL   Lactic Acid, Plasma    Collection Time: 05/12/24  6:29 PM    Specimen: Blood   Result Value Ref Range    Lactate 1.1 0.5 - 2.0 mmol/L   Blood Gas, Arterial With Co-Ox    Collection Time: 05/12/24  8:17 PM    Specimen: Arterial Blood   Result Value Ref Range    Site Right Radial     Crescencio's Test Positive     pH, Arterial 7.367 7.350 - 7.450 pH units    pCO2, Arterial 59.4 (C) 35.0 - 45.0 mm Hg    pO2, Arterial 91.4 75.0 - 100.0 mm Hg    HCO3, Arterial 34.1 (H) 22.0 - 28.0 mmol/L    Base Excess, Arterial 7.3 (H) 0.0 - 2.0 mmol/L    O2 Saturation, Arterial 97.1 94.0 - 100.0 %    Hematocrit, Blood Gas 31.3 %    Oxyhemoglobin 95.8 94 - 99 %    Methemoglobin 0.50 0.00 - 1.50 %    Carboxyhemoglobin 0.9 0 - 2 %    A-a DO2      Barometric Pressure for Blood Gas 732 mmHg    Modality Nasal Cannula     Flow Rate 6.0 lpm    Ventilator Mode NA     Collected by [id number]     pH, Temp Corrected      pCO2, Temperature Corrected      pO2, Temperature Corrected         If labs were ordered, I independently reviewed the results and considered them in treating the patient.        RADIOLOGY  CT Angiogram Chest Pulmonary Embolism    Result Date: 5/12/2024  FINAL REPORT TECHNIQUE: Multiple axial CT images were obtained through the chest following IV contrast using a CTA/PE protocol.  3D/MIP reconstruction images were also performed. This study was performed with techniques to keep radiation doses as low as reasonably achievable (ALARA). Individualized dose reduction  techniques using automated exposure control or adjustment of mA and/or kV according to the patient's size were employed. CLINICAL HISTORY: shortness of breath with tachycardia, concern for PE FINDINGS: PAs and aorta: No pulmonary embolus.  Thoracic aorta is unremarkable.  There is coronary artery disease. Heart/mediastinum: No evidence for right heart strain.  No pericardial effusion.  The heart is normal in size.  Lungs: There is severe emphysema. There is bronchial wall thickening with peripheral mucous plugging and tree-in-bud nodularity in the right middle lobe and right lower lobe.  Lymph nodes: No pathologically enlarged thoracic lymph nodes.  Pleura: No pneumothorax or pleural effusion.  Chest Wall: No chest wall contusion.  Bones: No acute fracture.  Upper abdomen: No acute findings in the upper abdomen.     No pulmonary embolus.   Severe emphysema with bronchitis and bronchopneumonia. Authenticated and Electronically Signed by Kirill Blanchard MD on 05/12/2024 09:21:27 PM     I ordered and independently reviewed the above noted radiographic studies.      I viewed images of chest x-ray which showed evidence of pneumonia per my independent interpretation.    I viewed images of the CT pulmonary embolism scan which showed concerning findings for bronchopneumonia per my independent interpretation    See radiologist's dictation for official interpretation.        PROCEDURES    Procedures    ECG 12 Lead Other; SOA   Final Result          MEDICATIONS GIVEN IN ER    Medications   sodium chloride 0.9 % flush 10 mL (has no administration in time range)   sodium chloride 0.9 % flush 10 mL (has no administration in time range)   iopamidol (ISOVUE-300) 61 % injection 100 mL (100 mL Intravenous Given 5/12/24 1908)   albuterol (PROVENTIL) nebulizer solution 0.083% 2.5 mg/3mL (5 mg Nebulization Given 5/12/24 2019)   ipratropium (ATROVENT) nebulizer solution 1 mg (1 mg Nebulization Given 5/12/24 2019)   methylPREDNISolone  sodium succinate (SOLU-Medrol) injection 125 mg (125 mg Intravenous Given 5/12/24 1958)         MEDICAL DECISION MAKING, PROGRESS, and CONSULTS    All labs, if obtained, have been independently reviewed by me.  All radiology studies, if obtained, have been reviewed by me and the radiologist dictating the report.  All EKG's, if obtained, have been independently viewed and interpreted by me/my attending physician.      Discussion below represents my analysis of pertinent findings related to patient's condition, differential diagnosis, treatment plan and final disposition.    84-year-old male with history of COPD presenting to the emergency department for increased shortness of breath.  Patient normally wears 3 L nasal cannula at all times at home he was found at home wearing his 3 L of oxygen satting in the low 80s.  Here in the emergency department he is requiring 5 to 6 L nasal cannula to maintain low 90s oxygen saturations.  DuoNebs were given along with Solu-Medrol.  Laboratory workup showed no leukocytosis.  CMP was notable for a CO2 of 31.  ABG showed pCO2 of 59.4, pH of 7.36 I do not suspect this patient requires BiPAP at this time.  Procalcitonin magnesium and troponin were all within normal limits.  Lactic acid was normal.  On reevaluation patient is still requiring high oxygen higher than normal oxygen requirement to maintain his O2 saturation around 93%.  Given this acute hypoxic respiratory failure I contacted hospital medicine Dr. Kerley who agreed accept the patient to [facility]                     Differential diagnosis:    Differential diagnosis included but was not limited to acute hypoxic respiratory failure, pneumonia, PE, COPD exacerbation, among others      Additional sources:    - Discussed/ obtained information from independent historians: None available    - External (non-ED) record review: Extensive primary care and previous hospital admission record review    - Chronic  or social conditions impacting care: None    Orders placed during this visit:  Orders Placed This Encounter   Procedures    XR Chest 1 View    CT Angiogram Chest Pulmonary Embolism    High Sensitivity Troponin T    [place] Draw    Comprehensive Metabolic Panel    BNP    Single High Sensitivity Troponin T    CBC Auto Differential    Lactic Acid, Plasma    Magnesium    Procalcitonin    High Sensitivity Troponin T 2Hr    Blood Gas, Arterial -With Co-Ox Panel: Yes    Blood Gas, Arterial With Co-Ox    NPO Diet NPO Type: Strict NPO    Undress & Gown    Continuous Pulse Oximetry    Vital Signs    Oxygen Therapy- Nasal Cannula; Titrate 1-6 LPM Per SpO2; 90 - 95%    ECG 12 Lead Other; SOA    Insert peripheral IV    Insert Peripheral IV    Inpatient Admission    CBC & Differential    Green Top (Gel)    Lavender Top    Gold Top - SST    Light Blue Top         Additional orders considered but not ordered: None      ED Course:    Consultants: Hospital medicine Dr. Kerley    ED Course as of 05/12/24 2209   Sun May 12, 2024   2029 I have interviewed and examined the patient FACE-TO-FACE.  I reviewed the mid-level provider(s) note and agree with the clinical impression, plan, and disposition unless otherwise stated in the MDM below.    ATTENDING ATTESTATION  HPI: 84-year-old male with past medical history of COPD on baseline 3 L nasal cannula who presents with increasing shortness of breath, cough, generalized weakness for the past several days.  Patient is accompanied by his daughter who contributes to HPI.    EXAM:   General: Alert.  Ill-appearing, but nontoxic.  No acute distress.  Head: Normocephalic.  Atraumatic.  Eyes: No scleral icterus.  Cardiovascular: Regular rate and rhythm.  No murmurs.  No rubs.  2+ distal pulses bilaterally.  Respiratory: Decreased air entry bilateral bases.  No rales.  No rhonchi.  No respiratory distress.  GI: Abdomen is soft.  Nondistended.  Nontender to palpation.  No rebound.  No guarding.  No  CVA tenderness.  Neurologic: Oriented x 3.  No focal deficits.  Skin: No erythema.  No edema. No pallor. No cyanosis.       [JS]      ED Course User Index  [JS] Matthias Guillen DO              Shared Decision Making:  After my consideration of clinical presentation and any laboratory/radiology studies obtained, I discussed the findings with the patient/patient representative who is in agreement with the treatment plan and the final disposition.   Risks and benefits of discharge and/or observation/admission were discussed.       AS OF 22:09 EDT VITALS:    BP - 123/53  HR - 93  TEMP - 99.7 °F (37.6 °C) (Oral)  O2 SATS - 93%                  DIAGNOSIS  Final diagnoses:   Bronchopneumonia   Acute hypoxic respiratory failure         DISPOSITION  Admit      Please note that portions of this document were completed with voice recognition software.      Reynalod Fontaine PA-C  05/12/24 0500

## 2024-05-13 LAB
ANION GAP SERPL CALCULATED.3IONS-SCNC: 8.6 MMOL/L (ref 5–15)
BASOPHILS # BLD AUTO: 0.01 10*3/MM3 (ref 0–0.2)
BASOPHILS NFR BLD AUTO: 0.1 % (ref 0–1.5)
BUN SERPL-MCNC: 26 MG/DL (ref 8–23)
BUN/CREAT SERPL: 22.2 (ref 7–25)
CALCIUM SPEC-SCNC: 8.9 MG/DL (ref 8.6–10.5)
CHLORIDE SERPL-SCNC: 98 MMOL/L (ref 98–107)
CO2 SERPL-SCNC: 31.4 MMOL/L (ref 22–29)
CREAT SERPL-MCNC: 1.17 MG/DL (ref 0.76–1.27)
DEPRECATED RDW RBC AUTO: 46 FL (ref 37–54)
EGFRCR SERPLBLD CKD-EPI 2021: 60.7 ML/MIN/1.73
EOSINOPHIL # BLD AUTO: 0 10*3/MM3 (ref 0–0.4)
EOSINOPHIL NFR BLD AUTO: 0 % (ref 0.3–6.2)
ERYTHROCYTE [DISTWIDTH] IN BLOOD BY AUTOMATED COUNT: 12.6 % (ref 12.3–15.4)
GLUCOSE SERPL-MCNC: 169 MG/DL (ref 65–99)
HCT VFR BLD AUTO: 31.2 % (ref 37.5–51)
HGB BLD-MCNC: 9.9 G/DL (ref 13–17.7)
IMM GRANULOCYTES # BLD AUTO: 0.05 10*3/MM3 (ref 0–0.05)
IMM GRANULOCYTES NFR BLD AUTO: 0.6 % (ref 0–0.5)
LYMPHOCYTES # BLD AUTO: 0.48 10*3/MM3 (ref 0.7–3.1)
LYMPHOCYTES NFR BLD AUTO: 6.2 % (ref 19.6–45.3)
MCH RBC QN AUTO: 31.2 PG (ref 26.6–33)
MCHC RBC AUTO-ENTMCNC: 31.7 G/DL (ref 31.5–35.7)
MCV RBC AUTO: 98.4 FL (ref 79–97)
MONOCYTES # BLD AUTO: 0.24 10*3/MM3 (ref 0.1–0.9)
MONOCYTES NFR BLD AUTO: 3.1 % (ref 5–12)
NEUTROPHILS NFR BLD AUTO: 6.94 10*3/MM3 (ref 1.7–7)
NEUTROPHILS NFR BLD AUTO: 90 % (ref 42.7–76)
NRBC BLD AUTO-RTO: 0 /100 WBC (ref 0–0.2)
PLATELET # BLD AUTO: 182 10*3/MM3 (ref 140–450)
PMV BLD AUTO: 8.9 FL (ref 6–12)
POTASSIUM SERPL-SCNC: 4.8 MMOL/L (ref 3.5–5.2)
RBC # BLD AUTO: 3.17 10*6/MM3 (ref 4.14–5.8)
SODIUM SERPL-SCNC: 138 MMOL/L (ref 136–145)
WBC NRBC COR # BLD AUTO: 7.72 10*3/MM3 (ref 3.4–10.8)

## 2024-05-13 PROCEDURE — 25010000002 CEFTRIAXONE PER 250 MG: Performed by: STUDENT IN AN ORGANIZED HEALTH CARE EDUCATION/TRAINING PROGRAM

## 2024-05-13 PROCEDURE — 25810000003 SODIUM CHLORIDE 0.9 % SOLUTION: Performed by: STUDENT IN AN ORGANIZED HEALTH CARE EDUCATION/TRAINING PROGRAM

## 2024-05-13 PROCEDURE — 99223 1ST HOSP IP/OBS HIGH 75: CPT | Performed by: STUDENT IN AN ORGANIZED HEALTH CARE EDUCATION/TRAINING PROGRAM

## 2024-05-13 PROCEDURE — 25010000002 AZITHROMYCIN PER 500 MG: Performed by: STUDENT IN AN ORGANIZED HEALTH CARE EDUCATION/TRAINING PROGRAM

## 2024-05-13 PROCEDURE — 97165 OT EVAL LOW COMPLEX 30 MIN: CPT

## 2024-05-13 PROCEDURE — 25010000002 METHYLPREDNISOLONE PER 40 MG: Performed by: STUDENT IN AN ORGANIZED HEALTH CARE EDUCATION/TRAINING PROGRAM

## 2024-05-13 PROCEDURE — 97162 PT EVAL MOD COMPLEX 30 MIN: CPT

## 2024-05-13 PROCEDURE — 85025 COMPLETE CBC W/AUTO DIFF WBC: CPT | Performed by: STUDENT IN AN ORGANIZED HEALTH CARE EDUCATION/TRAINING PROGRAM

## 2024-05-13 PROCEDURE — 25010000002 ENOXAPARIN PER 10 MG: Performed by: STUDENT IN AN ORGANIZED HEALTH CARE EDUCATION/TRAINING PROGRAM

## 2024-05-13 PROCEDURE — 87040 BLOOD CULTURE FOR BACTERIA: CPT | Performed by: STUDENT IN AN ORGANIZED HEALTH CARE EDUCATION/TRAINING PROGRAM

## 2024-05-13 PROCEDURE — 80048 BASIC METABOLIC PNL TOTAL CA: CPT | Performed by: STUDENT IN AN ORGANIZED HEALTH CARE EDUCATION/TRAINING PROGRAM

## 2024-05-13 RX ORDER — SODIUM CHLORIDE 0.9 % (FLUSH) 0.9 %
10 SYRINGE (ML) INJECTION EVERY 12 HOURS SCHEDULED
Status: DISCONTINUED | OUTPATIENT
Start: 2024-05-13 | End: 2024-05-15 | Stop reason: HOSPADM

## 2024-05-13 RX ORDER — ONDANSETRON 2 MG/ML
4 INJECTION INTRAMUSCULAR; INTRAVENOUS EVERY 6 HOURS PRN
Status: DISCONTINUED | OUTPATIENT
Start: 2024-05-13 | End: 2024-05-15 | Stop reason: HOSPADM

## 2024-05-13 RX ORDER — NITROGLYCERIN 0.4 MG/1
0.4 TABLET SUBLINGUAL
Status: DISCONTINUED | OUTPATIENT
Start: 2024-05-13 | End: 2024-05-15 | Stop reason: HOSPADM

## 2024-05-13 RX ORDER — SODIUM CHLORIDE 9 MG/ML
40 INJECTION, SOLUTION INTRAVENOUS AS NEEDED
Status: DISCONTINUED | OUTPATIENT
Start: 2024-05-13 | End: 2024-05-15 | Stop reason: HOSPADM

## 2024-05-13 RX ORDER — ACETAMINOPHEN 325 MG/1
650 TABLET ORAL EVERY 4 HOURS PRN
Status: DISCONTINUED | OUTPATIENT
Start: 2024-05-13 | End: 2024-05-15 | Stop reason: HOSPADM

## 2024-05-13 RX ORDER — BISACODYL 5 MG/1
5 TABLET, DELAYED RELEASE ORAL DAILY PRN
Status: DISCONTINUED | OUTPATIENT
Start: 2024-05-13 | End: 2024-05-15 | Stop reason: HOSPADM

## 2024-05-13 RX ORDER — IPRATROPIUM BROMIDE AND ALBUTEROL SULFATE 2.5; .5 MG/3ML; MG/3ML
3 SOLUTION RESPIRATORY (INHALATION) EVERY 4 HOURS PRN
Status: DISCONTINUED | OUTPATIENT
Start: 2024-05-13 | End: 2024-05-14

## 2024-05-13 RX ORDER — ACETAMINOPHEN 160 MG/5ML
650 SOLUTION ORAL EVERY 4 HOURS PRN
Status: DISCONTINUED | OUTPATIENT
Start: 2024-05-13 | End: 2024-05-15 | Stop reason: HOSPADM

## 2024-05-13 RX ORDER — SODIUM CHLORIDE 0.9 % (FLUSH) 0.9 %
10 SYRINGE (ML) INJECTION AS NEEDED
Status: DISCONTINUED | OUTPATIENT
Start: 2024-05-13 | End: 2024-05-15 | Stop reason: HOSPADM

## 2024-05-13 RX ORDER — METHYLPREDNISOLONE SODIUM SUCCINATE 40 MG/ML
40 INJECTION, POWDER, LYOPHILIZED, FOR SOLUTION INTRAMUSCULAR; INTRAVENOUS EVERY 12 HOURS
Status: DISCONTINUED | OUTPATIENT
Start: 2024-05-13 | End: 2024-05-15 | Stop reason: HOSPADM

## 2024-05-13 RX ORDER — ENOXAPARIN SODIUM 100 MG/ML
40 INJECTION SUBCUTANEOUS DAILY
Status: DISCONTINUED | OUTPATIENT
Start: 2024-05-13 | End: 2024-05-13

## 2024-05-13 RX ORDER — POLYETHYLENE GLYCOL 3350 17 G/17G
17 POWDER, FOR SOLUTION ORAL DAILY PRN
Status: DISCONTINUED | OUTPATIENT
Start: 2024-05-13 | End: 2024-05-15 | Stop reason: HOSPADM

## 2024-05-13 RX ORDER — AMOXICILLIN 250 MG
2 CAPSULE ORAL 2 TIMES DAILY PRN
Status: DISCONTINUED | OUTPATIENT
Start: 2024-05-13 | End: 2024-05-15 | Stop reason: HOSPADM

## 2024-05-13 RX ORDER — BISACODYL 10 MG
10 SUPPOSITORY, RECTAL RECTAL DAILY PRN
Status: DISCONTINUED | OUTPATIENT
Start: 2024-05-13 | End: 2024-05-15 | Stop reason: HOSPADM

## 2024-05-13 RX ORDER — ENOXAPARIN SODIUM 100 MG/ML
40 INJECTION SUBCUTANEOUS DAILY
Status: DISCONTINUED | OUTPATIENT
Start: 2024-05-13 | End: 2024-05-15 | Stop reason: HOSPADM

## 2024-05-13 RX ADMIN — METHYLPREDNISOLONE SODIUM SUCCINATE 40 MG: 40 INJECTION, POWDER, FOR SOLUTION INTRAMUSCULAR; INTRAVENOUS at 06:00

## 2024-05-13 RX ADMIN — Medication 10 ML: at 02:45

## 2024-05-13 RX ADMIN — Medication 10 ML: at 08:17

## 2024-05-13 RX ADMIN — ENOXAPARIN SODIUM 40 MG: 100 INJECTION SUBCUTANEOUS at 08:17

## 2024-05-13 RX ADMIN — METHYLPREDNISOLONE SODIUM SUCCINATE 40 MG: 40 INJECTION, POWDER, FOR SOLUTION INTRAMUSCULAR; INTRAVENOUS at 17:41

## 2024-05-13 RX ADMIN — CEFTRIAXONE SODIUM 1000 MG: 1 INJECTION, POWDER, FOR SOLUTION INTRAMUSCULAR; INTRAVENOUS at 02:50

## 2024-05-13 RX ADMIN — SODIUM CHLORIDE 500 MG: 900 INJECTION, SOLUTION INTRAVENOUS at 02:44

## 2024-05-13 RX ADMIN — Medication 10 ML: at 21:26

## 2024-05-13 NOTE — NURSING NOTE
Patient reports he does not know what medications he takes at home. Attempted to call wife; phone disconnected.

## 2024-05-13 NOTE — H&P
[facility]   HISTORY AND PHYSICAL      Name:  Gregorio Yu   Age:  84 y.o.  Sex:  male  :  1940  MRN:  [id number]   Visit Number:  [id number]  Admission Date:  2024  Date Of Service:  24  Primary Care Physician:  Xu Santos MD    Chief Complaint:     Shortness of air    History Of Presenting Illness:      Gregorio Yu is an 84-year-old man with past medical history of COPD 3 L baseline, CKD stage III, BPH, hyperlipidemia, GERD, anemia.  Presented to [facility] ED 2024 with concern for shortness of air for several days.  He denied fevers or chills, chest pain, cough, abdominal pain, N/V/D.    ED summary: Tmax 99.7, tachycardic which improved, otherwise vital signs stable on 6 L.  ABG CO2 retention pH 7.36, pCO2 59, pO2 91, bicarb 34.  EKG sinus tachycardia rate 116, nonspecific ST-T wave changes.  Troponin 16, 16.  proBNP not elevated.  Blood glucose 130.  Lactate not elevated.  Procalcitonin not elevated.  No leukocytosis.  Hemoglobin 10.  CT angio chest no PE, does show severe emphysema with bronchitis and bronchopneumonia.  He was provided albuterol, Atrovent, Solu-Medrol.    Review Of Systems:    All systems were reviewed and negative except as mentioned in history of presenting illness, assessment and plan.    Past Medical History: Patient  has a past medical history of COPD (chronic obstructive pulmonary disease).    Past Surgical History: Patient  has a past surgical history that includes Hernia repair and Tonsillectomy.    Social History: Patient  reports that he quit smoking about 15 years ago. He does not have any smokeless tobacco history on file. He reports that he does not drink alcohol and does not use drugs.    Family History:  Patient's family history has been reviewed and found to be noncontributory.     Allergies:      Patient has no known allergies.    Home Medications:    Prior to Admission Medications       Prescriptions Last Dose  "Informant Patient Reported? Taking?    doxycycline (MONODOX) 100 MG capsule   No No    Take 1 capsule by mouth 2 (Two) Times a Day.          ED Medications:    Medications   sodium chloride 0.9 % flush 10 mL (has no administration in time range)   sodium chloride 0.9 % flush 10 mL (has no administration in time range)   iopamidol (ISOVUE-300) 61 % injection 100 mL (100 mL Intravenous Given 5/12/24 1908)   albuterol (PROVENTIL) nebulizer solution 0.083% 2.5 mg/3mL (5 mg Nebulization Given 5/12/24 2019)   ipratropium (ATROVENT) nebulizer solution 1 mg (1 mg Nebulization Given 5/12/24 2019)   methylPREDNISolone sodium succinate (SOLU-Medrol) injection 125 mg (125 mg Intravenous Given 5/12/24 1958)     Vital Signs:  Temp:  [99.7 °F (37.6 °C)] 99.7 °F (37.6 °C)  Heart Rate:  [] 68  Resp:  [20] 20  BP: (104-146)/(42-77) 113/42        05/12/24  1823   Weight: 74.8 kg (165 lb)     Body mass index is 25.9 kg/m².    Physical Exam:     Most recent vital Signs: /42   Pulse 68   Temp 99.7 °F (37.6 °C) (Oral)   Resp 20   Ht 170 cm (66.93\")   Wt 74.8 kg (165 lb)   SpO2 95%   BMI 25.90 kg/m²     Physical Exam  Constitutional:       General: He is not in acute distress.     Appearance: He is ill-appearing. He is not toxic-appearing.   HENT:      Head: Normocephalic.      Mouth/Throat:      Mouth: Mucous membranes are moist.   Eyes:      Extraocular Movements: Extraocular movements intact.   Cardiovascular:      Rate and Rhythm: Normal rate and regular rhythm.      Pulses: Normal pulses.      Heart sounds: Normal heart sounds.   Pulmonary:      Effort: Pulmonary effort is normal.      Comments: Moderately diminished all fields  Abdominal:      Palpations: Abdomen is soft.      Tenderness: There is no abdominal tenderness.   Musculoskeletal:      Right lower leg: No edema.      Left lower leg: No edema.   Skin:     General: Skin is warm.      Capillary Refill: Capillary refill takes less than 2 seconds. " "  Neurological:      General: No focal deficit present.      Mental Status: He is alert.   Psychiatric:         Mood and Affect: Mood normal.         Thought Content: Thought content normal.         Laboratory data:    I have reviewed the labs done in the emergency room.    Results from last 7 days   Lab Units 05/12/24  1828   SODIUM mmol/L 139   POTASSIUM mmol/L 4.5   CHLORIDE mmol/L 98   CO2 mmol/L 31.0*   BUN mg/dL 25*   CREATININE mg/dL 1.24   CALCIUM mg/dL 9.1   BILIRUBIN mg/dL 0.4   ALK PHOS U/L 75   ALT (SGPT) U/L 20   AST (SGOT) U/L 18   GLUCOSE mg/dL 130*     Results from last 7 days   Lab Units 05/12/24  1828   WBC 10*3/mm3 7.87   HEMOGLOBIN g/dL 10.0*   HEMATOCRIT % 32.1*   PLATELETS 10*3/mm3 182         Results from last 7 days   Lab Units 05/12/24  2241 05/12/24  1828   HSTROP T ng/L 16 16  16     Results from last 7 days   Lab Units 05/12/24  1828   PROBNP pg/mL 322.3             Results from last 7 days   Lab Units 05/12/24 2017   PH, ARTERIAL pH units 7.367   PO2 ART mm Hg 91.4   PCO2, ARTERIAL mm Hg 59.4*   HCO3 ART mmol/L 34.1*           Invalid input(s): \"USDES\", \"NITRITITE\", \"BACT\", \"EP\"    Pain Management Panel           No data to display                EKG:      EKG sinus tachycardia rate 116, nonspecific ST-T wave changes.      Radiology:    CT Angiogram Chest Pulmonary Embolism    Result Date: 5/12/2024  FINAL REPORT TECHNIQUE: Multiple axial CT images were obtained through the chest following IV contrast using a CTA/PE protocol.  3D/MIP reconstruction images were also performed. This study was performed with techniques to keep radiation doses as low as reasonably achievable (ALARA). Individualized dose reduction techniques using automated exposure control or adjustment of mA and/or kV according to the patient's size were employed. CLINICAL HISTORY: shortness of breath with tachycardia, concern for PE FINDINGS: PAs and aorta: No pulmonary embolus.  Thoracic aorta is unremarkable.  There is " coronary artery disease. Heart/mediastinum: No evidence for right heart strain.  No pericardial effusion.  The heart is normal in size.  Lungs: There is severe emphysema. There is bronchial wall thickening with peripheral mucous plugging and tree-in-bud nodularity in the right middle lobe and right lower lobe.  Lymph nodes: No pathologically enlarged thoracic lymph nodes.  Pleura: No pneumothorax or pleural effusion.  Chest Wall: No chest wall contusion.  Bones: No acute fracture.  Upper abdomen: No acute findings in the upper abdomen.     No pulmonary embolus.   Severe emphysema with bronchitis and bronchopneumonia. Authenticated and Electronically Signed by Kirill Blanchard MD on 05/12/2024 09:21:27 PM     Assessment/Plan:    Inpatient general floor admission 5/12/2024 with acute on chronic respiratory failure with hypoxia and hypercapnia secondary to suspected bronchopneumonia and COPD exacerbation.    At time of admission resting in bed, woken up from sleep and said his breathing feels okay.  No respiratory distress.    Acute on chronic respiratory failure with hypoxia and hypercapnia  Bronchopneumonia  COPD exacerbation  Supplemental oxygen as needed keep saturation above 90%.  Rocephin and azithromycin.  Solu-Medrol.  Bronchodilators.    Chronic:  COPD 3 L baseline, CKD stage III, BPH, hyperlipidemia, GERD, anemia    Continue home medications.    Risk Assessment: High  DVT Prophylaxis: Lovenox  Code Status: Full code (need confirmation)  Diet: Cardiac    Advance Care Planning   ACP discussion was held with the patient during this visit. Patient does not have an advance directive, information provided.           Brian Joseph Kerley, DO  05/13/24  01:50 EDT    Dictated utilizing Dragon dictation.

## 2024-05-13 NOTE — THERAPY EVALUATION
Patient Name: Gregorio Yu  : 1940    MRN: [id number]                              Today's Date: 2024       Admit Date: 2024    Visit Dx:     ICD-10-CM ICD-9-CM   1. Bronchopneumonia  J18.0 485   2. Acute hypoxic respiratory failure  J96.01 518.81     Patient Active Problem List   Diagnosis    Acute hypoxic respiratory failure     Past Medical History:   Diagnosis Date    COPD (chronic obstructive pulmonary disease)      Past Surgical History:   Procedure Laterality Date    HERNIA REPAIR      TONSILLECTOMY        General Information       Row Name 24 1226          OT Time and Intention    Document Type evaluation  -SD     Mode of Treatment occupational therapy  -SD       Row Name 24 1226          General Information    Patient Profile Reviewed yes  -SD     Prior Level of Function independent:;all household mobility;ADL's  Patient lives with his wife and daughter. Mostly uses a SPC, also has a rollator. Has a shower chair, BSC, wears 3L O2 at baseline.  -SD     Existing Precautions/Restrictions fall;oxygen therapy device and L/min  -SD     Barriers to Rehab medically complex;previous functional deficit;hearing deficit  -SD       Row Name 24 1226          Living Environment    People in Home spouse;child(jules), adult  -SD       Row Name 24 1226          Home Main Entrance    Number of Stairs, Main Entrance five  -SD     Stair Railings, Main Entrance railings on both sides of stairs  -SD       Row Name 24 1226          Stairs Within Home, Primary    Number of Stairs, Within Home, Primary none  -SD       Row Name 24 1226          Cognition    Orientation Status (Cognition) oriented x 4  -SD       Row Name 24 1226          Safety Issues, Functional Mobility    Safety Issues Affecting Function (Mobility) safety precautions follow-through/compliance;safety precaution awareness  -SD     Impairments Affecting Function (Mobility) balance;endurance/activity  tolerance;shortness of breath;strength  -SD               User Key  (r) = Recorded By, (t) = Taken By, (c) = Cosigned By      Initials Name Provider Type    SD Renata Gupta OT Occupational Therapist                     Mobility/ADL's       Row Name 05/13/24 1230          Bed Mobility    Bed Mobility supine-sit  -SD     Supine-Sit [place] (Bed Mobility) modified independence  -SD     Assistive Device (Bed Mobility) bed rails;head of bed elevated  -SD       Row Name 05/13/24 1230          Transfers    Transfers sit-stand transfer  -SD       Row Name 05/13/24 1230          Sit-Stand Transfer    Sit-Stand [place] (Transfers) contact guard  -SD     Assistive Device (Sit-Stand Transfers) walker, front-wheeled  -SD       Row Name 05/13/24 1230          Functional Mobility    Functional Mobility- Ind. Level contact guard assist  -SD     Functional Mobility- Device walker, front-wheeled  -SD     Functional Mobility-Distance (Feet) 9  -SD     Functional Mobility- Safety Issues balance decreased during turns;sequencing ability decreased;supplemental O2  -SD       Row Name 05/13/24 1230          Activities of Daily Living    BADL Assessment/Intervention bathing;upper body dressing;lower body dressing;grooming;feeding;toileting  -SD       Row Name 05/13/24 1230          Bathing Assessment/Intervention    [place] Level (Bathing) minimum assist (75% patient effort)  -SD       Row Name 05/13/24 1230          Upper Body Dressing Assessment/Training    [place] Level (Upper Body Dressing) standby assist  -SD       Row Name 05/13/24 1230          Lower Body Dressing Assessment/Training    [place] Level (Lower Body Dressing) minimum assist (75% patient effort)  -SD       Row Name 05/13/24 1230          Grooming Assessment/Training    [place] Level (Grooming) standby assist  -SD       [facility] Name 05/13/24 1230          Self-Feeding Assessment/Training    [place] Level (Feeding) supervision  -SD        Row Name 05/13/24 1230          Toileting Assessment/Training    [place] Level (Toileting) minimum assist (75% patient effort)  -SD               User Key  (r) = Recorded By, (t) = Taken By, (c) = Cosigned By      Initials Name Provider Type    Renata Fulton OT Occupational Therapist                   Obj/Interventions       Row Name 05/13/24 1230          Range of Motion Comprehensive    General Range of Motion bilateral upper extremity ROM WFL  -SD       Row Name 05/13/24 1230          Strength Comprehensive (MMT)    General Manual Muscle Testing (MMT) Assessment upper extremity strength deficits identified  -SD     Comment, General Manual Muscle Testing (MMT) Assessment UB 3+/5 - 4-/5  -SD               User Key  (r) = Recorded By, (t) = Taken By, (c) = Cosigned By      Initials Name Provider Type    Renata Fulton OT Occupational Therapist                   Goals/Plan       Row Name 05/13/24 1234          Transfer Goal 1 (OT)    Activity/Assistive Device (Transfer Goal 1, OT) sit-to-stand/stand-to-sit;walker, rolling  -SD     [place] Level/Cues Needed (Transfer Goal 1, OT) modified independence  -SD     Time Frame (Transfer Goal 1, OT) long term goal (LTG)  -SD     Progress/Outcome (Transfer Goal 1, OT) goal ongoing  -SD       Row Name 05/13/24 1234          Dressing Goal 1 (OT)    Activity/Device (Dressing Goal 1, OT) lower body dressing  -SD     [place]/Cues Needed (Dressing Goal 1, OT) standby assist  -SD     Time Frame (Dressing Goal 1, OT) 2 weeks  -SD     Progress/Outcome (Dressing Goal 1, OT) goal ongoing  -SD       Row Name 05/13/24 1234          Toileting Goal 1 (OT)    Activity/Device (Toileting Goal 1, OT) toileting skills, all;commode, bedside without drop arms;raised toilet seat  -SD     [place] Level/Cues Needed (Toileting Goal 1, OT) standby assist  -SD     Time Frame (Toileting Goal 1, OT) 2 weeks  -SD     Progress/Outcome (Toileting Goal 1, OT) goal ongoing  -SD        Row Name 05/13/24 1234          Strength Goal 1 (OT)    Strength Goal 1 (OT) Patient to perform UB ther ex as tolerated  -SD     Time Frame (Strength Goal 1, OT) long term goal (LTG)  -SD     Progress/Outcome (Strength Goal 1, OT) goal ongoing  -SD       Row Name 05/13/24 1234          Therapy Assessment/Plan (OT)    Planned Therapy Interventions (OT) activity tolerance training;adaptive equipment training;BADL retraining;patient/caregiver education/training;ROM/therapeutic exercise;strengthening exercise;transfer/mobility retraining  -SD               User Key  (r) = Recorded By, (t) = Taken By, (c) = Cosigned By      Initials Name Provider Type    Renata Fulton OT Occupational Therapist                   Clinical Impression       Row Name 05/13/24 1231          Pain Assessment    Pretreatment Pain Rating 0/10 - no pain  -SD     Posttreatment Pain Rating 0/10 - no pain  -SD       Row Name 05/13/24 1231          Plan of Care Review    Plan of Care Reviewed With patient  -SD     Progress no change  -SD     Outcome Evaluation OT eval completed. Patient is supine in bed, Ox4, denies pain at rest. Patient lives with his wife and daughter, mainly uses a cane, also has a rollator, shower chair, BSC, wears 3L O2 at baseline. Patient performed supine to sit withg modified ind, sit to stand with CGA and walked 9' using RW with CGA, satting 89% following mobility. Patient requires min A overall for ADLs. Patient is expected to benefit from continued OT services prior to DC. Would benefit from HH services.  -SD       Row Name 05/13/24 1231          Therapy Assessment/Plan (OT)    Patient/Family Therapy Goal Statement (OT) home with HH  -SD     Rehab Potential (OT) fair, will monitor progress closely  -SD     Criteria for Skilled Therapeutic Interventions Met (OT) skilled treatment is necessary  -SD     Therapy Frequency (OT) 3 times/wk  5 times if indicated  -SD       Row Name 05/13/24 1231          Therapy Plan  Review/Discharge Plan (OT)    Anticipated Discharge Disposition (OT) home with assist;home with home health  -SD       Row Name 05/13/24 1231          Vital Signs    Pre SpO2 (%) 97  -SD     O2 Delivery Pre Treatment supplemental O2  -SD     Intra SpO2 (%) 89  -SD     O2 Delivery Intra Treatment supplemental O2  -SD     Post SpO2 (%) 92  -SD     O2 Delivery Post Treatment supplemental O2  -SD       Row Name 05/13/24 1231          Positioning and Restraints    Pre-Treatment Position in bed  -SD     Post Treatment Position chair  -SD     In Chair sitting;call light within reach;encouraged to call for assist  -SD               User Key  (r) = Recorded By, (t) = Taken By, (c) = Cosigned By      Initials Name Provider Type    Renata Fulton OT Occupational Therapist                   Outcome Measures       Row Name 05/13/24 1234          How much help from another is currently needed...    Putting on and taking off regular lower body clothing? 3  -SD     Bathing (including washing, rinsing, and drying) 3  -SD     Toileting (which includes using toilet bed pan or urinal) 3  -SD     Putting on and taking off regular upper body clothing 3  -SD     Taking care of personal grooming (such as brushing teeth) 3  -SD     Eating meals 3  -SD     AM-PAC 6 Clicks Score (OT) 18  -SD       Row Name 05/13/24 1020          How much help from another person do you currently need...    Turning from your back to your side while in flat bed without using bedrails? 3  -MC     Moving from lying on back to sitting on the side of a flat bed without bedrails? 3  -MC     Moving to and from a bed to a chair (including a wheelchair)? 3  -MC     Standing up from a chair using your arms (e.g., wheelchair, bedside chair)? 3  -MC     Climbing 3-5 steps with a railing? 2  -MC     To walk in hospital room? 2  -MC     AM-PAC 6 Clicks Score (PT) 16  -MC     Highest Level of Mobility Goal 5 --> Static standing  -MC       Row Name 05/13/24 1230           Functional Assessment    Outcome Measure Options AM-PAC 6 Clicks Daily Activity (OT)  -SD               User Key  (r) = Recorded By, (t) = Taken By, (c) = Cosigned By      Initials Name Provider Type    SD Renata Gupta OT Occupational Therapist    Mayda Mantilla, RN Registered Nurse                    Occupational Therapy Education       Title: PT OT SLP Therapies (In Progress)       Topic: Occupational Therapy (In Progress)       Point: ADL training (Done)       Description:   Instruct learner(s) on proper safety adaptation and remediation techniques during self care or transfers.   Instruct in proper use of assistive devices.                  Learning Progress Summary             Patient Acceptance, E,TB, VU by SD at 5/13/2024 0197    Comment: OT POC                         Point: Home exercise program (Not Started)       Description:   Instruct learner(s) on appropriate technique for monitoring, assisting and/or progressing therapeutic exercises/activities.                  Learner Progress:  Not documented in this visit.              Point: Precautions (Not Started)       Description:   Instruct learner(s) on prescribed precautions during self-care and functional transfers.                  Learner Progress:  Not documented in this visit.              Point: Body mechanics (Not Started)       Description:   Instruct learner(s) on proper positioning and spine alignment during self-care, functional mobility activities and/or exercises.                  Learner Progress:  Not documented in this visit.                              User Key       Initials Effective Dates Name Provider Type Discipline    SD 06/16/21 -  Renata Gupta OT Occupational Therapist OT                  OT Recommendation and Plan  Planned Therapy Interventions (OT): activity tolerance training, adaptive equipment training, BADL retraining, patient/caregiver education/training, ROM/therapeutic exercise, strengthening exercise,  transfer/mobility retraining  Therapy Frequency (OT): 3 times/wk (5 times if indicated)  Plan of Care Review  Plan of Care Reviewed With: patient  Progress: no change  Outcome Evaluation: OT eval completed. Patient is supine in bed, Ox4, denies pain at rest. Patient lives with his wife and daughter, mainly uses a cane, also has a rollator, shower chair, BSC, wears 3L O2 at baseline. Patient performed supine to sit withg modified ind, sit to stand with CGA and walked 9' using RW with CGA, satting 89% following mobility. Patient requires min A overall for ADLs. Patient is expected to benefit from continued OT services prior to DC. Would benefit from  services.     Time Calculation:   Evaluation Complexity (OT)  Review Occupational Profile/Medical/Therapy History Complexity: brief/low complexity  Assessment, Occupational Performance/Identification of Deficit Complexity: 1-3 performance deficits  Clinical Decision Making Complexity (OT): problem focused assessment/low complexity  Overall Complexity of Evaluation (OT): low complexity     Time Calculation- OT       Row Name 05/13/24 1235             Time Calculation- OT    OT Start Time 1036  -SD      OT Received On 05/13/24  -SD      OT Goal Re-Cert Due Date 05/23/24  -SD         Untimed Charges    OT Eval/Re-eval Minutes 45  -SD         Total Minutes    Untimed Charges Total Minutes 45  -SD       Total Minutes 45  -SD                User Key  (r) = Recorded By, (t) = Taken By, (c) = Cosigned By      Initials Name Provider Type    Renata Fulton OT Occupational Therapist                  Therapy Charges for Today       Code Description Service Date Service Provider Modifiers Qty    [id number]  OT EVAL LOW COMPLEXITY 3 5/13/2024 Renata Gupta OT GO 1                 Renata Gupta OT  5/13/2024

## 2024-05-13 NOTE — THERAPY EVALUATION
Patient Name: Gregorio Yu  : 1940    MRN: [id number]                              Today's Date: 2024       Admit Date: 2024    Visit Dx:     ICD-10-CM ICD-9-CM   1. Bronchopneumonia  J18.0 485   2. Acute hypoxic respiratory failure  J96.01 518.81     Patient Active Problem List   Diagnosis    Acute hypoxic respiratory failure     Past Medical History:   Diagnosis Date    COPD (chronic obstructive pulmonary disease)      Past Surgical History:   Procedure Laterality Date    HERNIA REPAIR      TONSILLECTOMY        General Information       Row Name 24 1240          Physical Therapy Time and Intention    Document Type evaluation  -     Mode of Treatment physical therapy  -       Row Name 24 1240          General Information    Patient Profile Reviewed yes  -     Prior Level of Function independent:;all household mobility  -     Existing Precautions/Restrictions oxygen therapy device and L/min;fall  -     Barriers to Rehab medically complex;previous functional deficit;hearing deficit  -       Row Name 24 1240          Living Environment    People in Home child(jules), adult;spouse  -       Row Name 24 1240          Home Main Entrance    Number of Stairs, Main Entrance five  -     Stair Railings, Main Entrance railings on both sides of stairs  -       Row Name 24 1240          Stairs Within Home, Primary    Number of Stairs, Within Home, Primary none  -       Row Name 24 1240          Cognition    Orientation Status (Cognition) oriented x 4  -       Row Name 24 1240          Safety Issues, Functional Mobility    Safety Issues Affecting Function (Mobility) safety precautions follow-through/compliance;safety precaution awareness  -     Impairments Affecting Function (Mobility) balance;endurance/activity tolerance;shortness of breath;strength  -               User Key  (r) = Recorded By, (t) = Taken By, (c) = Cosigned By      Initials  Name Provider Type    Doretha Mcnair PT Physical Therapist                   Mobility       Row Name 05/13/24 1240          Bed Mobility    Bed Mobility supine-sit  -     Supine-Sit T[place] (Bed Mobility) modified independence  -     Assistive Device (Bed Mobility) bed rails;head of bed elevated  -       Row Name 05/13/24 1240          Sit-Stand Transfer    Sit-Stand Tatum (Transfers) contact guard  -     Assistive Device (Sit-Stand Transfers) walker, front-wheeled  -HW       Row Name 05/13/24 1240          Gait/Stairs (Locomotion)    Tatum Level (Gait) contact guard  -     Assistive Device (Gait) walker, front-wheeled  -HW     Patient was able to Ambulate yes  -     Distance in Feet (Gait) 9  -HW     Deviations/Abnormal Patterns (Gait) bilateral deviations;festinating/shuffling;gait speed decreased;fariha decreased  -     Bilateral Gait Deviations forward flexed posture  -     Tatum Level (Stairs) not tested  -               User Key  (r) = Recorded By, (t) = Taken By, (c) = Cosigned By      Initials Name Provider Type     Doretha Cesar PT Physical Therapist                   Obj/Interventions       Row Name 05/13/24 1241          Range of Motion Comprehensive    General Range of Motion bilateral lower extremity ROM WFL  -       Row Name 05/13/24 1241          Strength Comprehensive (MMT)    General Manual Muscle Testing (MMT) Assessment lower extremity strength deficits identified  -     Comment, General Manual Muscle Testing (MMT) Assessment BLE MMT grossly 3+/5  -       Row Name 05/13/24 1241          Balance    Balance Assessment sitting static balance;sitting dynamic balance;sit to stand dynamic balance;standing static balance;standing dynamic balance  -     Static Sitting Balance standby assist  -     Dynamic Sitting Balance standby assist  -     Position, Sitting Balance unsupported;sitting edge of bed  -     Sit to Stand Dynamic Balance contact  guard  -HW     Static Standing Balance contact guard  -HW     Dynamic Standing Balance contact guard  -HW     Position/Device Used, Standing Balance walker, front-wheeled;supported  -       Row Name 05/13/24 1241          Sensory Assessment (Somatosensory)    Sensory Assessment (Somatosensory) not tested  -               User Key  (r) = Recorded By, (t) = Taken By, (c) = Cosigned By      Initials Name Provider Type     Doretha Cesar, PT Physical Therapist                   Goals/Plan       Row Name 05/13/24 1246          Bed Mobility Goal 1 (PT)    Activity/Assistive Device (Bed Mobility Goal 1, PT) bed mobility activities, all  -HW     Kings Level/Cues Needed (Bed Mobility Goal 1, PT) independent  -HW     Time Frame (Bed Mobility Goal 1, PT) long term goal (LTG);10 days  -HW     Progress/Outcomes (Bed Mobility Goal 1, PT) new goal  -       Row Name 05/13/24 1246          Transfer Goal 1 (PT)    Activity/Assistive Device (Transfer Goal 1, PT) transfers, all;other (see comments)  LRAD  -HW     Kings Level/Cues Needed (Transfer Goal 1, PT) modified independence  -HW     Time Frame (Transfer Goal 1, PT) long term goal (LTG);10 days  -HW     Progress/Outcome (Transfer Goal 1, PT) new goal  -       Row Name 05/13/24 1246          Gait Training Goal 1 (PT)    Activity/Assistive Device (Gait Training Goal 1, PT) gait (walking locomotion);other (see comments)  LRAD  -HW     Kings Level (Gait Training Goal 1, PT) modified independence  -HW     Distance (Gait Training Goal 1, PT) 50  -HW     Time Frame (Gait Training Goal 1, PT) long term goal (LTG);10 days  -HW     Progress/Outcome (Gait Training Goal 1, PT) new goal  -       Row Name 05/13/24 1246          Patient Education Goal (PT)    Activity (Patient Education Goal, PT) Pt will demonstrate the ability to perform 3x10 BLE ther-ex with mod (I) to allow for improved BLE strength and endurance  -HW     Kings/Cues/Accuracy (Memory Goal  2, PT) demonstrates adequately  -     Time Frame (Patient Education Goal, PT) short term goal (STG);5 days  -     Progress/Outcome (Patient Education Goal, PT) new goal  -       Row Name 05/13/24 1246          Therapy Assessment/Plan (PT)    Planned Therapy Interventions (PT) balance training;bed mobility training;gait training;home exercise program;neuromuscular re-education;postural re-education;transfer training;stretching;strengthening;stair training;patient/family education;lumbar stabilization;ROM (range of motion)  -               User Key  (r) = Recorded By, (t) = Taken By, (c) = Cosigned By      Initials Name Provider Type     Doretha Cesar, PT Physical Therapist                   Clinical Impression       Row Name 05/13/24 1242          Pain    Pretreatment Pain Rating 0/10 - no pain  -     Posttreatment Pain Rating 0/10 - no pain  -       Row Name 05/13/24 1242          Plan of Care Review    Plan of Care Reviewed With patient  -     Progress no change  -     Outcome Evaluation Pt participated in PT initial evaluation this date. Pt presents supine in bed, pleasant and agreeable to PT evaluation. Pt denies reports of pain at rest. Pt reports at baseline he lives at home with his wife and daughter in a H with 5 PUSHPA, (B) handrail present. Pt reports he ambulates household distances without AD, states sometimes he uses a cane. Pt denies reports of falls at home. Pt performed supine to sit on EOB with mod (I), STS transfer with CGA and ambulated x9' with RW and CGA. Following evaluation, pt left seated in bedside chair with call light and all needs within reach. Recommend skilled PT intervention during IP admission to address identified impairments, reduce risk of falls and prevent functional decline. Once medically stable, recommend pt to d/c home with support from family and HHPT to address remaining deficits.  -       Row Name 05/13/24 1243          Therapy Assessment/Plan (PT)     Patient/Family Therapy Goals Statement (PT) Pt reports he would like to return home at time of d/c  -HW     Rehab Potential (PT) good, to achieve stated therapy goals  -     Criteria for Skilled Interventions Met (PT) yes  -HW     Therapy Frequency (PT) 5 times/wk  -     Predicted Duration of Therapy Intervention (PT) 10 days  -       Row Name 05/13/24 1242          Vital Signs    Pre SpO2 (%) 97  -HW     O2 Delivery Pre Treatment supplemental O2  3L  -HW     Intra SpO2 (%) 89  -HW     O2 Delivery Intra Treatment supplemental O2  -HW     Post SpO2 (%) 92  -HW     O2 Delivery Post Treatment supplemental O2  -HW     Pre Patient Position Supine  -HW     Intra Patient Position Standing  -HW     Post Patient Position Sitting  -       Row Name 05/13/24 1242          Positioning and Restraints    Pre-Treatment Position in bed  -HW     Post Treatment Position chair  -HW     In Chair sitting;call light within reach;encouraged to call for assist  -               User Key  (r) = Recorded By, (t) = Taken By, (c) = Cosigned By      Initials Name Provider Type     Doretha Cesar, PT Physical Therapist                   Outcome Measures       Row Name 05/13/24 1247 05/13/24 1020       How much help from another person do you currently need...    Turning from your back to your side while in flat bed without using bedrails? 3  -HW 3  -MC    Moving from lying on back to sitting on the side of a flat bed without bedrails? 3  -HW 3  -MC    Moving to and from a bed to a chair (including a wheelchair)? 3  -HW 3  -MC    Standing up from a chair using your arms (e.g., wheelchair, bedside chair)? 3  - 3  -MC    Climbing 3-5 steps with a railing? 3  -HW 2  -MC    To walk in hospital room? 3  -HW 2  -MC    AM-PAC 6 Clicks Score (PT) 18  - 16  -MC    Highest Level of Mobility Goal 6 --> Walk 10 steps or more  - 5 --> Static standing  -      Row Name 05/13/24 1247 05/13/24 1234       Functional Assessment    Outcome Measure  Options AM-PAC 6 Clicks Basic Mobility (PT)  - AM-PAC 6 Clicks Daily Activity (OT)  -SD              User Key  (r) = Recorded By, (t) = Taken By, (c) = Cosigned By      Initials Name Provider Type    SD Renata Gupta, OT Occupational Therapist     Doretha Cesar, PT Physical Therapist    Mayda Mantilla, RN Registered Nurse                                 Physical Therapy Education       Title: PT OT SLP Therapies (In Progress)       Topic: Physical Therapy (In Progress)       Point: Mobility training (Done)       Learning Progress Summary             Patient Acceptance, E,TB, VU by  at 5/13/2024 3308    Comment: Educated pt on importance of OOB mobility during IP Admission                         Point: Home exercise program (Not Started)       Learner Progress:  Not documented in this visit.              Point: Body mechanics (Not Started)       Learner Progress:  Not documented in this visit.              Point: Precautions (Not Started)       Learner Progress:  Not documented in this visit.                              User Key       Initials Effective Dates Name Provider Type Discipline     11/29/23 -  Doretha Cesar, PT Physical Therapist PT                  PT Recommendation and Plan  Planned Therapy Interventions (PT): balance training, bed mobility training, gait training, home exercise program, neuromuscular re-education, postural re-education, transfer training, stretching, strengthening, stair training, patient/family education, lumbar stabilization, ROM (range of motion)  Plan of Care Reviewed With: patient  Progress: no change  Outcome Evaluation: Pt participated in PT initial evaluation this date. Pt presents supine in bed, pleasant and agreeable to PT evaluation. Pt denies reports of pain at rest. Pt reports at baseline he lives at home with his wife and daughter in a [facility] with 5 PUSHPA, (B) handrail present. Pt reports he ambulates household distances without AD, states sometimes he uses a  cane. Pt denies reports of falls at home. Pt performed supine to sit on EOB with mod (I), STS transfer with CGA and ambulated x9' with RW and CGA. Following evaluation, pt left seated in bedside chair with call light and all needs within reach. Recommend skilled PT intervention during IP admission to address identified impairments, reduce risk of falls and prevent functional decline. Once medically stable, recommend pt to d/c home with support from family and HHPT to address remaining deficits.     Time Calculation:   PT Evaluation Complexity  History, PT Evaluation Complexity: 1-2 personal factors and/or comorbidities  Examination of Body Systems (PT Eval Complexity): total of 3 or more elements  Clinical Presentation (PT Evaluation Complexity): evolving  Clinical Decision Making (PT Evaluation Complexity): moderate complexity  Overall Complexity (PT Evaluation Complexity): moderate complexity     PT Charges       Row Name 05/13/24 1248             Time Calculation    Start Time 1037  -HW      PT Received On 05/13/24  -HW      PT Goal Re-Cert Due Date 05/23/24  -         Untimed Charges    PT Eval/Re-eval Minutes 41  -HW         Total Minutes    Untimed Charges Total Minutes 41  -HW       Total Minutes 41  -HW                User Key  (r) = Recorded By, (t) = Taken By, (c) = Cosigned By      Initials Name Provider Type     Doretha Cesar, RAFAEL Physical Therapist                  Therapy Charges for Today       Code Description Service Date Service Provider Modifiers Qty    [id number]  PT EVAL MOD COMPLEXITY 3 5/13/2024 Doretha Cesar PT GP 1            PT G-Codes  Outcome Measure Options: AM-PAC 6 Clicks Basic Mobility (PT)  AM-PAC 6 Clicks Score (PT): 18  AM-PAC 6 Clicks Score (OT): 18  PT Discharge Summary  Anticipated Discharge Disposition (PT): home with assist, home with home health    Doretha Cesar PT  5/13/2024

## 2024-05-13 NOTE — PLAN OF CARE
Problem: Fall Injury Risk  Goal: Absence of Fall and Fall-Related Injury  Intervention: Identify and Manage Contributors  Recent Flowsheet Documentation  Taken 5/13/2024 1600 by Chilango Wilkerson RN  Medication Review/Management: medications reviewed  Taken 5/13/2024 1416 by Chilango Wilkerson RN  Medication Review/Management: medications reviewed  Self-Care Promotion: independence encouraged  Intervention: Promote Injury-Free Environment  Recent Flowsheet Documentation  Taken 5/13/2024 1600 by Chilango Wilkerson RN  Safety Promotion/Fall Prevention:   clutter free environment maintained   assistive device/personal items within reach   activity supervised   fall prevention program maintained   gait belt   lighting adjusted   nonskid shoes/slippers when out of bed   room organization consistent   safety round/check completed     Problem: Adult Inpatient Plan of Care  Goal: Absence of Hospital-Acquired Illness or Injury  Intervention: Identify and Manage Fall Risk  Recent Flowsheet Documentation  Taken 5/13/2024 1600 by Chilango Wilkerson RN  Safety Promotion/Fall Prevention:   clutter free environment maintained   assistive device/personal items within reach   activity supervised   fall prevention program maintained   gait belt   lighting adjusted   nonskid shoes/slippers when out of bed   room organization consistent   safety round/check completed  Intervention: Prevent Skin Injury  Recent Flowsheet Documentation  Taken 5/13/2024 1600 by Chilango Wilkerson RN  Body Position: dangle, side of bed  Taken 5/13/2024 1416 by Chilango Wilkerson RN  Skin Protection:   adhesive use limited   tubing/devices free from skin contact  Intervention: Prevent and Manage VTE (Venous Thromboembolism) Risk  Recent Flowsheet Documentation  Taken 5/13/2024 1600 by Chilango Wilkerson RN  Activity Management: activity encouraged  Taken 5/13/2024 1416 by Chilango Wilkerson RN  Range of Motion: active ROM (range of motion) encouraged  Intervention: Prevent Infection  Recent  Flowsheet Documentation  Taken 5/13/2024 1600 by Chilango Wilkerson RN  Infection Prevention:   environmental surveillance performed   single patient room provided  Taken 5/13/2024 1416 by Chilango Wilkerson RN  Infection Prevention:   environmental surveillance performed   single patient room provided  Goal: Optimal Comfort and Wellbeing  Intervention: Provide Person-Centered Care  Recent Flowsheet Documentation  Taken 5/13/2024 1416 by Chilango Wilkerson RN  Trust Relationship/Rapport:   care explained   thoughts/feelings acknowledged   reassurance provided   questions encouraged   questions answered     Problem: COPD (Chronic Obstructive Pulmonary Disease) Comorbidity  Goal: Maintenance of COPD Symptom Control  Intervention: Maintain COPD-Symptom Control  Recent Flowsheet Documentation  Taken 5/13/2024 1600 by Chialngo Wilkerson RN  Medication Review/Management: medications reviewed  Taken 5/13/2024 1416 by Chilango Wilkerson RN  Supportive Measures: active listening utilized  Medication Review/Management: medications reviewed     Problem: Hypertension Comorbidity  Goal: Blood Pressure in Desired Range  Intervention: Maintain Blood Pressure Management  Recent Flowsheet Documentation  Taken 5/13/2024 1600 by Chilango Wilkerson RN  Medication Review/Management: medications reviewed  Taken 5/13/2024 1416 by Chilango Wilkerson RN  Medication Review/Management: medications reviewed     Problem: Skin Injury Risk Increased  Goal: Skin Health and Integrity  Intervention: Optimize Skin Protection  Recent Flowsheet Documentation  Taken 5/13/2024 1416 by Chilango Wilkerson RN  Pressure Reduction Techniques:   frequent weight shift encouraged   weight shift assistance provided  Pressure Reduction Devices: positioning supports utilized  Skin Protection:   adhesive use limited   tubing/devices free from skin contact   Goal Outcome Evaluation:  Plan of Care Reviewed With: patient        Progress: no change  Outcome Evaluation: Patient admitted to the unit during  shift.

## 2024-05-13 NOTE — PLAN OF CARE
Goal Outcome Evaluation:  Plan of Care Reviewed With: patient        Progress: no change  Outcome Evaluation: Pt participated in PT initial evaluation this date. Pt presents supine in bed, pleasant and agreeable to PT evaluation. Pt denies reports of pain at rest. Pt reports at baseline he lives at home with his wife and daughter in a [facility] with 5 PUSHPA, (B) handrail present. Pt reports he ambulates household distances without AD, states sometimes he uses a cane. Pt denies reports of falls at home. Pt performed supine to sit on EOB with mod (I), STS transfer with CGA and ambulated x9' with RW and CGA. Following evaluation, pt left seated in bedside chair with call light and all needs within reach. Recommend skilled PT intervention during IP admission to address identified impairments, reduce risk of falls and prevent functional decline. Once medically stable, recommend pt to d/c home with support from family and HHPT to address remaining deficits.      Anticipated Discharge Disposition (PT): home with assist, home with home health

## 2024-05-13 NOTE — PROGRESS NOTES
H and P reviewed. VSS. On home oxygen. Still dyspneic recieiving pneumonia and copd treatment. Per nursing family was looking to get assessed for rehab. Will continue inpatient management today.

## 2024-05-13 NOTE — ED NOTES
Pt assisted to stand at bedside and urinate. Pt c/o SOA at this time. RT called to come to bedside.

## 2024-05-13 NOTE — CASE MANAGEMENT/SOCIAL WORK
Discharge Planning Assessment  [facility]     Patient Name: Gregorio Yu  MRN: [id number]  Today's Date: 5/13/2024    Admit Date: 5/12/2024    Plan: The patient is awake and able to answer questions.  He is a current patient of Dr. Santos and gets his medications from [facility].  He elects to enroll in Meds to Bed.  He uses a walker and oxygen at home.  He is unable to recall the name of his oxygen supply comapany.  He denies the need for additional DME or services.  At the time of DC the patient plans to return to the home he shares with his wife and daughter.  Questions and concerns were addressed at the time of this conversation.  IMM delivered at earlier time.  Will provide additional resources and information upon patient request.   Discharge Needs Assessment       Row Name 05/13/24 1132       Living Environment    People in Home spouse    Name(s) of People in Home Thao Yu, wife and adult daughter    Current Living Arrangements home    Duration at Residence 40 years    Potentially Unsafe Housing Conditions none    In the past 12 months has the electric, gas, oil, or water company threatened to shut off services in your home? No    Primary Care Provided by self    Provides Primary Care For no one, unable/limited ability to care for self    Family Caregiver if Needed none    Quality of Family Relationships helpful;involved;supportive    Able to Return to Prior Arrangements yes       Resource/Environmental Concerns    Resource/Environmental Concerns none    Transportation Concerns none       Transportation Needs    In the past 12 months, has lack of transportation kept you from medical appointments or from getting medications? no    In the past 12 months, has lack of transportation kept you from meetings, work, or from getting things needed for daily living? No       Food Insecurity    Within the past 12 months, you worried that your food would run out before you got the money to buy more.  Never true    Within the past 12 months, the food you bought just didn't last and you didn't have money to get more. Never true       Transition Planning    Patient/Family Anticipates Transition to home with family    Patient/Family Anticipated Services at Transition none    Transportation Anticipated family or friend will provide       Discharge Needs Assessment    Readmission Within the Last 30 Days no previous admission in last 30 days    Equipment Currently Used at Home walker, rolling;oxygen    Concerns to be Addressed denies needs/concerns at this time    Anticipated Changes Related to Illness none    Equipment Needed After Discharge none    Provided Post Acute Provider List? N/A    N/A Provider List Comment Patient plans to return home; no new DME needs    Provided Post Acute Provider Quality & Resource List? N/A    N/A Quality & Resource List Comment Patient plans to return home; no new DME needs    Patient's Choice of Community Agency(s) Patient unable to recall name of oxygen supply company he uses                   Discharge Plan       Row Name 05/13/24 1134       Plan    Plan The patient is awake and able to answer questions.  He is a current patient of Dr. Santos and gets his medications from [facility].  He elects to enroll in Meds to Bed.  He uses a walker and oxygen at home.  He is unable to recall the name of his oxygen supply comapany.  He denies the need for additional DME or services.  At the time of DC the patient plans to return to the home he shares with his wife and daughter.  Questions and concerns were addressed at the time of this conversation.  IMM delivered at earlier time.  Will provide additional resources and information upon patient request.    Patient/Family in Agreement with Plan yes    Provided Post Acute Provider List? N/A    N/A Provider List Comment Patient plans to return home; no new DME needs    Provided Post Acute Provider Quality & Resource List? N/A    N/A  Quality & Resource List Comment Patient plans to return home; no new DME needs    Plan Comments Denies needs at this time    Final Discharge Disposition Code 01 - home or self-care    Final Note Plans to DC home with his wife and olu                  Continued Care and Services - Admitted Since 5/12/2024    No active coordination exists for this encounter.          Demographic Summary       Row Name 05/13/24 1131       General Information    Admission Type inpatient    Arrived From emergency department    Required Notices Provided Important Message from Medicare    Referral Source admission list    Reason for Consult discharge planning    Preferred Language English       Contact Information    Permission Granted to Share Info With                    Functional Status       Row Name 05/13/24 1131       Functional Status    Usual Activity Tolerance good    Current Activity Tolerance moderate       Physical Activity    On average, how many days per week do you engage in moderate to strenuous exercise (like a brisk walk)? 0 days    On average, how many minutes do you engage in exercise at this level? 0 min    Number of minutes of exercise per week 0       Assessment of Health Literacy    How often do you have someone help you read hospital materials? Never    How often do you have problems learning about your medical condition because of difficulty understanding written information? Never    How often do you have a problem understanding what is told to you about your medical condition? Never    How confident are you filling out medical forms by yourself? Extremely    Health Literacy Excellent       Functional Status, IADL    Medications independent    Meal Preparation assistive person    Housekeeping assistive person    Laundry assistive person    Shopping assistive person       Mental Status    General Appearance WDL WDL       Mental Status Summary    Recent Changes in Mental Status/Cognitive  Functioning no changes                   Psychosocial       Row Name 05/13/24 1132       Values/Beliefs    Spiritual, Cultural Beliefs, Jehovah's witness Practices, Values that Affect Care no       Behavior WDL    Behavior WDL WDL       Emotion Mood WDL    Emotion/Mood/Affect WDL WDL       Speech WDL    Speech WDL WDL       Perceptual State WDL    Perceptual State WDL WDL       Thought Process WDL    Thought Process WDL WDL       Intellectual Performance WDL    Intellectual Performance WDL WDL                   Abuse/Neglect       Row Name 05/13/24 1132       Personal Safety    Feels Unsafe at Home or Work/School no    Feels Threatened by Someone no    Does Anyone Try to Keep You From Having Contact with Others or Doing Things Outside Your Home? no    Physical Signs of Abuse Present no                   Legal       Row Name 05/13/24 1132       Financial Resource Strain    How hard is it for you to pay for the very basics like food, housing, medical care, and heating? Not hard                   Substance Abuse       Row Name 05/13/24 1132       Substance Use    Substance Use Status never used                   Patient Forms       Row Name 05/13/24 1136       Patient Forms    Important Message from Medicare (IMM) Delivered    Delivered to Patient    Method of delivery In person                      Donna Gonzalez RN

## 2024-05-13 NOTE — PLAN OF CARE
Goal Outcome Evaluation:  Plan of Care Reviewed With: patient        Progress: no change  Outcome Evaluation: OT eval completed. Patient is supine in bed, Ox4, denies pain at rest. Patient lives with his wife and daughter, mainly uses a cane, also has a rollator, shower chair, BSC, wears 3L O2 at baseline. Patient performed supine to sit withg modified ind, sit to stand with CGA and walked 9' using RW with CGA, satting 89% following mobility. Patient requires min A overall for ADLs. Patient is expected to benefit from continued OT services prior to DC. Would benefit from HH services.      Anticipated Discharge Disposition (OT): home with assist, home with home health

## 2024-05-14 LAB
ANION GAP SERPL CALCULATED.3IONS-SCNC: 3.9 MMOL/L (ref 5–15)
BASOPHILS # BLD AUTO: 0 10*3/MM3 (ref 0–0.2)
BASOPHILS NFR BLD AUTO: 0 % (ref 0–1.5)
BUN SERPL-MCNC: 43 MG/DL (ref 8–23)
BUN/CREAT SERPL: 30.9 (ref 7–25)
CALCIUM SPEC-SCNC: 8.8 MG/DL (ref 8.6–10.5)
CHLORIDE SERPL-SCNC: 102 MMOL/L (ref 98–107)
CO2 SERPL-SCNC: 35.1 MMOL/L (ref 22–29)
CREAT SERPL-MCNC: 1.39 MG/DL (ref 0.76–1.27)
DEPRECATED RDW RBC AUTO: 46.5 FL (ref 37–54)
EGFRCR SERPLBLD CKD-EPI 2021: 49.4 ML/MIN/1.73
EOSINOPHIL # BLD AUTO: 0 10*3/MM3 (ref 0–0.4)
EOSINOPHIL NFR BLD AUTO: 0 % (ref 0.3–6.2)
ERYTHROCYTE [DISTWIDTH] IN BLOOD BY AUTOMATED COUNT: 12.6 % (ref 12.3–15.4)
GLUCOSE SERPL-MCNC: 136 MG/DL (ref 65–99)
HCT VFR BLD AUTO: 31.5 % (ref 37.5–51)
HGB BLD-MCNC: 9.8 G/DL (ref 13–17.7)
IMM GRANULOCYTES # BLD AUTO: 0.08 10*3/MM3 (ref 0–0.05)
IMM GRANULOCYTES NFR BLD AUTO: 1 % (ref 0–0.5)
LYMPHOCYTES # BLD AUTO: 0.62 10*3/MM3 (ref 0.7–3.1)
LYMPHOCYTES NFR BLD AUTO: 7.6 % (ref 19.6–45.3)
MCH RBC QN AUTO: 31.1 PG (ref 26.6–33)
MCHC RBC AUTO-ENTMCNC: 31.1 G/DL (ref 31.5–35.7)
MCV RBC AUTO: 100 FL (ref 79–97)
MONOCYTES # BLD AUTO: 0.45 10*3/MM3 (ref 0.1–0.9)
MONOCYTES NFR BLD AUTO: 5.5 % (ref 5–12)
NEUTROPHILS NFR BLD AUTO: 6.97 10*3/MM3 (ref 1.7–7)
NEUTROPHILS NFR BLD AUTO: 85.9 % (ref 42.7–76)
NRBC BLD AUTO-RTO: 0 /100 WBC (ref 0–0.2)
PLATELET # BLD AUTO: 219 10*3/MM3 (ref 140–450)
PMV BLD AUTO: 9.1 FL (ref 6–12)
POTASSIUM SERPL-SCNC: 5.4 MMOL/L (ref 3.5–5.2)
RBC # BLD AUTO: 3.15 10*6/MM3 (ref 4.14–5.8)
SODIUM SERPL-SCNC: 141 MMOL/L (ref 136–145)
WBC NRBC COR # BLD AUTO: 8.12 10*3/MM3 (ref 3.4–10.8)

## 2024-05-14 PROCEDURE — 94761 N-INVAS EAR/PLS OXIMETRY MLT: CPT

## 2024-05-14 PROCEDURE — 25810000003 SODIUM CHLORIDE 0.9 % SOLUTION: Performed by: STUDENT IN AN ORGANIZED HEALTH CARE EDUCATION/TRAINING PROGRAM

## 2024-05-14 PROCEDURE — 94799 UNLISTED PULMONARY SVC/PX: CPT

## 2024-05-14 PROCEDURE — 99232 SBSQ HOSP IP/OBS MODERATE 35: CPT | Performed by: INTERNAL MEDICINE

## 2024-05-14 PROCEDURE — 94664 DEMO&/EVAL PT USE INHALER: CPT

## 2024-05-14 PROCEDURE — 97110 THERAPEUTIC EXERCISES: CPT

## 2024-05-14 PROCEDURE — 25010000002 CEFTRIAXONE PER 250 MG: Performed by: INTERNAL MEDICINE

## 2024-05-14 PROCEDURE — 25010000002 ENOXAPARIN PER 10 MG: Performed by: STUDENT IN AN ORGANIZED HEALTH CARE EDUCATION/TRAINING PROGRAM

## 2024-05-14 PROCEDURE — 85025 COMPLETE CBC W/AUTO DIFF WBC: CPT | Performed by: STUDENT IN AN ORGANIZED HEALTH CARE EDUCATION/TRAINING PROGRAM

## 2024-05-14 PROCEDURE — 97116 GAIT TRAINING THERAPY: CPT

## 2024-05-14 PROCEDURE — 25010000002 METHYLPREDNISOLONE PER 40 MG: Performed by: STUDENT IN AN ORGANIZED HEALTH CARE EDUCATION/TRAINING PROGRAM

## 2024-05-14 PROCEDURE — 80048 BASIC METABOLIC PNL TOTAL CA: CPT | Performed by: STUDENT IN AN ORGANIZED HEALTH CARE EDUCATION/TRAINING PROGRAM

## 2024-05-14 PROCEDURE — 25010000002 AZITHROMYCIN PER 500 MG: Performed by: STUDENT IN AN ORGANIZED HEALTH CARE EDUCATION/TRAINING PROGRAM

## 2024-05-14 RX ORDER — FLUTICASONE PROPIONATE AND SALMETEROL 250; 50 UG/1; UG/1
1 POWDER RESPIRATORY (INHALATION)
Qty: 60 EACH | Refills: 0 | Status: SHIPPED | OUTPATIENT
Start: 2024-05-14 | End: 2024-06-13

## 2024-05-14 RX ORDER — CEFDINIR 300 MG/1
300 CAPSULE ORAL 2 TIMES DAILY
Qty: 6 CAPSULE | Refills: 0 | Status: SHIPPED | OUTPATIENT
Start: 2024-05-14 | End: 2024-05-17

## 2024-05-14 RX ORDER — AZITHROMYCIN 500 MG/1
500 TABLET, FILM COATED ORAL ONCE
Qty: 1 TABLET | Refills: 0 | Status: SHIPPED | OUTPATIENT
Start: 2024-05-14 | End: 2024-05-15 | Stop reason: HOSPADM

## 2024-05-14 RX ORDER — ALBUTEROL SULFATE 90 UG/1
2 AEROSOL, METERED RESPIRATORY (INHALATION) EVERY 4 HOURS PRN
Qty: 8.5 G | Refills: 2 | Status: SHIPPED | OUTPATIENT
Start: 2024-05-14

## 2024-05-14 RX ORDER — IPRATROPIUM BROMIDE AND ALBUTEROL SULFATE 2.5; .5 MG/3ML; MG/3ML
3 SOLUTION RESPIRATORY (INHALATION)
Status: DISCONTINUED | OUTPATIENT
Start: 2024-05-14 | End: 2024-05-14

## 2024-05-14 RX ORDER — IPRATROPIUM BROMIDE AND ALBUTEROL SULFATE 2.5; .5 MG/3ML; MG/3ML
3 SOLUTION RESPIRATORY (INHALATION)
Status: DISCONTINUED | OUTPATIENT
Start: 2024-05-14 | End: 2024-05-15 | Stop reason: HOSPADM

## 2024-05-14 RX ORDER — PREDNISONE 10 MG/1
TABLET ORAL
Qty: 30 TABLET | Refills: 0 | Status: SHIPPED | OUTPATIENT
Start: 2024-05-14 | End: 2024-05-26

## 2024-05-14 RX ORDER — BUDESONIDE 0.5 MG/2ML
1 INHALANT ORAL
Status: DISCONTINUED | OUTPATIENT
Start: 2024-05-14 | End: 2024-05-15 | Stop reason: HOSPADM

## 2024-05-14 RX ORDER — TIOTROPIUM BROMIDE INHALATION SPRAY 3.12 UG/1
2 SPRAY, METERED RESPIRATORY (INHALATION) DAILY
Qty: 1 EACH | Refills: 0 | Status: SHIPPED | OUTPATIENT
Start: 2024-05-14 | End: 2024-06-13

## 2024-05-14 RX ADMIN — METHYLPREDNISOLONE SODIUM SUCCINATE 40 MG: 40 INJECTION, POWDER, FOR SOLUTION INTRAMUSCULAR; INTRAVENOUS at 05:45

## 2024-05-14 RX ADMIN — SODIUM CHLORIDE 500 MG: 900 INJECTION, SOLUTION INTRAVENOUS at 04:20

## 2024-05-14 RX ADMIN — Medication 10 ML: at 19:36

## 2024-05-14 RX ADMIN — ENOXAPARIN SODIUM 40 MG: 100 INJECTION SUBCUTANEOUS at 09:55

## 2024-05-14 RX ADMIN — IPRATROPIUM BROMIDE AND ALBUTEROL SULFATE 3 ML: .5; 3 SOLUTION RESPIRATORY (INHALATION) at 13:03

## 2024-05-14 RX ADMIN — CEFTRIAXONE SODIUM 1000 MG: 1 INJECTION, POWDER, FOR SOLUTION INTRAMUSCULAR; INTRAVENOUS at 04:49

## 2024-05-14 RX ADMIN — BUDESONIDE 1 MG: 0.5 INHALANT RESPIRATORY (INHALATION) at 18:44

## 2024-05-14 RX ADMIN — IPRATROPIUM BROMIDE AND ALBUTEROL SULFATE 3 ML: .5; 3 SOLUTION RESPIRATORY (INHALATION) at 18:44

## 2024-05-14 RX ADMIN — METHYLPREDNISOLONE SODIUM SUCCINATE 40 MG: 40 INJECTION, POWDER, FOR SOLUTION INTRAMUSCULAR; INTRAVENOUS at 18:14

## 2024-05-14 RX ADMIN — Medication 10 ML: at 10:37

## 2024-05-14 NOTE — PLAN OF CARE
Goal Outcome Evaluation:  Plan of Care Reviewed With: patient        Progress: improving  Outcome Evaluation: Pt received supine in bed on 4L O2 via nc.  Pt completed ther ex using yellow theraband for resistance as per flow sheet.  Pt stood with sba and walked to head of bed with sba.  Pts O2 noted to decrease 87% with activity.  Pt feels like he is ready to d/c home.  Pt was left in bed with call light within reach, bed alarm on.  Cont OT per POC.

## 2024-05-14 NOTE — PROGRESS NOTES
[facility]    Name:  Gregorio Yu   Age:  84 y.o.  Sex:  male  :  1940  MRN:  [id number]   Visit Number:  [id number]  Admission Date:  2024  Date Of Service:  24  Primary Care Physician:  Xu Santos MD     LOS: 2 days :    Chief Complaint:      dyspnea    Subjective:    24: patient [place], per nursing daughter not able to take care of him at home at this time. Relayed message to the . Patient is SOA but reports this is normal for him.    History Of Presenting Illness:       Gregorio Yu is an 84-year-old man with past medical history of COPD 3 L baseline, CKD stage III, BPH, hyperlipidemia, GERD, anemia.  Presented to [facility] ED 2024 with concern for shortness of air for several days.  He denied fevers or chills, chest pain, cough, abdominal pain, N/V/D.     ED summary: Tmax 99.7, tachycardic which improved, otherwise vital signs stable on 6 L.  ABG CO2 retention pH 7.36, pCO2 59, pO2 91, bicarb 34.  EKG sinus tachycardia rate 116, nonspecific ST-T wave changes.  Troponin 16, 16.  proBNP not elevated.  Blood glucose 130.  Lactate not elevated.  Procalcitonin not elevated.  No leukocytosis.  Hemoglobin 10.  CT angio chest no PE, does show severe emphysema with bronchitis and bronchopneumonia.  He was provided albuterol, Atrovent, Solu-Medrol.  Edited by: Bobby Muse DO at 2024 0848     Review of Systems:     All systems were reviewed and negative except as mentioned in subjective, assessment and plan.    Vital Signs:    Temp:  [97.8 °F (36.6 °C)-98.4 °F (36.9 °C)] 98.1 °F (36.7 °C)  Heart Rate:  [68-94] 85  Resp:  [16-21] 16  BP: (121-154)/(63-93) 143/82    Intake and output:    I/O last 3 completed shifts:  In: 660 [P.O.:60; IV Piggyback:600]  Out: -   No intake/output data recorded.    Physical Examination:    Constitutional:       General: He is not in acute distress.     Appearance: He is ill-appearing.  He is not toxic-appearing.   HENT:      Head: Normocephalic.      Mouth/Throat:      Mouth: Mucous membranes are moist.   Eyes:      Extraocular Movements: Extraocular movements intact.   Cardiovascular:      Rate and Rhythm: Normal rate and regular rhythm.      Pulses: Normal pulses.      Heart sounds: Normal heart sounds.   Pulmonary:      Effort: Pulmonary effort is mildly labored.      Comments: Moderately diminished all fields  Abdominal:      Palpations: Abdomen is soft.      Tenderness: There is no abdominal tenderness.   Musculoskeletal:      Right lower leg: No edema.      Left lower leg: No edema.   Skin:     General: Skin is warm.      Capillary Refill: Capillary refill takes less than 2 seconds.   Neurological:      General: No focal deficit present.      Mental Status: He is alert.   Psychiatric:         Mood and Affect: Mood normal.         Thought Content: Thought content normal.   Exam stable 5/14/24  Edited by: Bobby Muse,  at 5/14/2024 0852     Laboratory results:    Results from last 7 days   Lab Units 05/14/24  0710 05/13/24  0230 05/12/24  1828   SODIUM mmol/L 141 138 139   POTASSIUM mmol/L 5.4* 4.8 4.5   CHLORIDE mmol/L 102 98 98   CO2 mmol/L 35.1* 31.4* 31.0*   BUN mg/dL 43* 26* 25*   CREATININE mg/dL 1.39* 1.17 1.24   CALCIUM mg/dL 8.8 8.9 9.1   BILIRUBIN mg/dL  --   --  0.4   ALK PHOS U/L  --   --  75   ALT (SGPT) U/L  --   --  20   AST (SGOT) U/L  --   --  18   GLUCOSE mg/dL 136* 169* 130*     Results from last 7 days   Lab Units 05/14/24  0710 05/13/24  0230 05/12/24  1828   WBC 10*3/mm3 8.12 7.72 7.87   HEMOGLOBIN g/dL 9.8* 9.9* 10.0*   HEMATOCRIT % 31.5* 31.2* 32.1*   PLATELETS 10*3/mm3 219 182 182         Results from last 7 days   Lab Units 05/12/24  2241 05/12/24  1828   HSTROP T ng/L 16 16  16     Results from last 7 days   Lab Units 05/13/24  0234 05/13/24  0230   BLOODCX  No growth at 24 hours No growth at 24 hours     Recent Labs     05/12/24  2017   PHART 7.904    JEQ8NPM 59.4*   PO2ART 91.4   UPW8FES 34.1*   BASEEXCESS 7.3*      I have reviewed the patient's laboratory results.    Radiology results:    XR Chest 1 View    Result Date: 5/13/2024  PROCEDURE: XR CHEST 1 VW-  HISTORY: SOA Triage Protocol  COMPARISON: 08/20/2023  FINDINGS:  Portable view of the chest demonstrates patchy airspace disease right lung base compatible with pneumonia. Left lung is clear. There is no evidence of effusion, pneumothorax or other significant pleural disease. The mediastinum is unremarkable.  The heart size is normal.      Impression: Right basilar pneumonia.    This report was signed and finalized on 5/13/2024 8:39 AM by Luiz Marina MD.      CT Angiogram Chest Pulmonary Embolism    Result Date: 5/12/2024  FINAL REPORT TECHNIQUE: Multiple axial CT images were obtained through the chest following IV contrast using a CTA/PE protocol.  3D/MIP reconstruction images were also performed. This study was performed with techniques to keep radiation doses as low as reasonably achievable (ALARA). Individualized dose reduction techniques using automated exposure control or adjustment of mA and/or kV according to the patient's size were employed. CLINICAL HISTORY: shortness of breath with tachycardia, concern for PE FINDINGS: PAs and aorta: No pulmonary embolus.  Thoracic aorta is unremarkable.  There is coronary artery disease. Heart/mediastinum: No evidence for right heart strain.  No pericardial effusion.  The heart is normal in size.  Lungs: There is severe emphysema. There is bronchial wall thickening with peripheral mucous plugging and tree-in-bud nodularity in the right middle lobe and right lower lobe.  Lymph nodes: No pathologically enlarged thoracic lymph nodes.  Pleura: No pneumothorax or pleural effusion.  Chest Wall: No chest wall contusion.  Bones: No acute fracture.  Upper abdomen: No acute findings in the upper abdomen.     Impression: No pulmonary embolus.   Severe emphysema with  bronchitis and bronchopneumonia. Authenticated and Electronically Signed by Kirill Blanchard MD on 05/12/2024 09:21:27 PM   I have reviewed the patient's radiology reports.    Medication Review:     I have reviewed the patient's active and prn medications.     Problem List:      Acute hypoxic respiratory failure      Assessment/Plan:    Acute on chronic respiratory failure with hypoxia and hypercapnia  Bronchopneumonia  COPD exacerbation  Acute respiratory failure due to pneumonia and AECOPD. Unfortunately poor baseline. Family having difficulty providing care he needs at home requesting rehab..  Rocephin and azithromycin.  Solu-Medrol pulmicort.  Bronchodilators.    Disposition: IPR in 2 to 3 days    Prophylaxis: Lovenox       Chronic:  COPD 3 L baseline, CKD stage III, BPH, hyperlipidemia, GERD, anemia     Edited by: Bobby Muse DO at 5/14/2024 0851     DVT Prophylaxis: lovenox  Code Status:   Code Status and Medical Interventions:   Ordered at: 05/13/24 0401     Code Status (Patient has no pulse and is not breathing):    CPR (Attempt to Resuscitate)     Medical Interventions (Patient has pulse or is breathing):    Full Support      Diet:   Dietary Orders (From admission, onward)       Start     Ordered    05/13/24 1756  Diet: Cardiac; Healthy Heart (2-3 Na+); Fluid Consistency: Thin (IDDSI 0)  Diet Effective Now        References:    Diet Order [place]   Question Answer Comment   Diets: Cardiac    Cardiac Diet: Healthy Heart (2-3 Na+)    Fluid Consistency: Thin (IDDSI 0)        05/13/24 1755                   Discharge Plan: IPR in 2 to 3 days    Bobby Muse DO  05/14/24  08:52 EDT    Dictated utilizing Dragon dictation.

## 2024-05-14 NOTE — PLAN OF CARE
Problem: Fall Injury Risk  Goal: Absence of Fall and Fall-Related Injury  Intervention: Identify and Manage Contributors  Recent Flowsheet Documentation  Taken 5/13/2024 2000 by Mayra Dao RN  Self-Care Promotion:   BADL personal objects within reach   BADL personal routines maintained  Intervention: Promote Injury-Free Environment  Recent Flowsheet Documentation  Taken 5/14/2024 0200 by Mayra Dao, RN  Safety Promotion/Fall Prevention:   activity supervised   assistive device/personal items within reach   clutter free environment maintained   nonskid shoes/slippers when out of bed   safety round/check completed   fall prevention program maintained  Taken 5/14/2024 0000 by Mayra Dao, RN  Safety Promotion/Fall Prevention:   activity supervised   assistive device/personal items within reach   clutter free environment maintained   nonskid shoes/slippers when out of bed   safety round/check completed   fall prevention program maintained  Taken 5/13/2024 2200 by Mayra Dao, RN  Safety Promotion/Fall Prevention:   activity supervised   assistive device/personal items within reach   clutter free environment maintained   nonskid shoes/slippers when out of bed   safety round/check completed   fall prevention program maintained  Taken 5/13/2024 2000 by Mayra Dao, RN  Safety Promotion/Fall Prevention:   activity supervised   assistive device/personal items within reach   clutter free environment maintained   nonskid shoes/slippers when out of bed   safety round/check completed   fall prevention program maintained   Goal Outcome Evaluation:

## 2024-05-14 NOTE — THERAPY TREATMENT NOTE
Patient Name: Gregorio Yu  : 1940    MRN: [id number]                              Today's Date: 2024       Admit Date: 2024    Visit Dx:     ICD-10-CM ICD-9-CM   1. Bronchopneumonia  J18.0 485   2. Acute hypoxic respiratory failure  J96.01 518.81     Patient Active Problem List   Diagnosis    Acute hypoxic respiratory failure     Past Medical History:   Diagnosis Date    COPD (chronic obstructive pulmonary disease)      Past Surgical History:   Procedure Laterality Date    HERNIA REPAIR      TONSILLECTOMY        General Information       Row Name 24 142          Physical Therapy Time and Intention    Document Type therapy note (daily note)  -RM     Mode of Treatment physical therapy  -RM       Row Name 24 142          General Information    Patient Profile Reviewed yes  -RM     Existing Precautions/Restrictions oxygen therapy device and L/min;fall  -RM       Row Name 24 142          Cognition    Orientation Status (Cognition) oriented x 4  -RM       Row Name 24 142          Safety Issues, Functional Mobility    Safety Issues Affecting Function (Mobility) safety precautions follow-through/compliance  -RM     Impairments Affecting Function (Mobility) shortness of breath  -RM               User Key  (r) = Recorded By, (t) = Taken By, (c) = Cosigned By      Initials Name Provider Type     Noah Merritt, PTA Physical Therapist Assistant                   Mobility       Row Name 24 142          Bed Mobility    Supine-Sit [place] (Bed Mobility) modified independence  -     Assistive Device (Bed Mobility) bed rails;head of bed elevated  -       Row Name 24 142          Sit-Stand Transfer    Sit-Stand [place] (Transfers) standby assist  -     Assistive Device (Sit-Stand Transfers) walker, front-wheeled  -       Row Name 24 142          Gait/Stairs (Locomotion)    [place] Level (Gait) standby assist;contact guard;verbal cues   -RM     Assistive Device (Gait) walker, front-wheeled  -RM     Distance in Feet (Gait) 50  x2  -RM     Deviations/Abnormal Patterns (Gait) bilateral deviations;festinating/shuffling;gait speed decreased;fariha decreased  -RM     Bilateral Gait Deviations forward flexed posture  -RM               User Key  (r) = Recorded By, (t) = Taken By, (c) = Cosigned By      Initials Name Provider Type     Noah Merritt, PTA Physical Therapist Assistant                   Obj/Interventions    No documentation.                  Goals/Plan    No documentation.                  Clinical Impression       Row Name 05/14/24 1423          Pain    Pretreatment Pain Rating 0/10 - no pain  -RM     Posttreatment Pain Rating 0/10 - no pain  -RM       Row Name 05/14/24 1423          Plan of Care Review    Plan of Care Reviewed With patient  -RM     Progress improving  -RM     Outcome Evaluation Pt supine in bed and willing to participate with treatment. Pt performed supine to sitting EOB mod I /supervision. Pt sba for STS with rw. Pt advanced gait distance to 50' x 2 with rw sba/cga. Pt mobility improved. Pt O2 saturation however dropped to 77% with 4L with mobility. Pt required approx 2 minutes to recover to 95%. See flowsheet for details. Cont PT per POC progressing to goals as pt tolerates.  -RM       Row Name 05/14/24 1423          Vital Signs    Pre SpO2 (%) 96  -RM     O2 Delivery Pre Treatment supplemental O2  4  -RM     Intra SpO2 (%) 77  -RM     O2 Delivery Intra Treatment supplemental O2  -RM     Post SpO2 (%) 95  -RM     O2 Delivery Post Treatment supplemental O2  -RM     Pre Patient Position Supine  -RM     Intra Patient Position Standing  -RM     Post Patient Position Sitting  -RM       Row Name 05/14/24 1423          Positioning and Restraints    Pre-Treatment Position in bed  -RM     Post Treatment Position bed  -RM     In Bed sitting EOB;call light within reach;encouraged to call for assist;exit alarm on;notified nsg   -RM               User Key  (r) = Recorded By, (t) = Taken By, (c) = Cosigned By      Initials Name Provider Type    Noah Jordan, PTA Physical Therapist Assistant                   Outcome Measures       Row Name 05/14/24 1426 05/14/24 0800       How much help from another person do you currently need...    Turning from your back to your side while in flat bed without using bedrails? 4  -RM 3  -SA    Moving from lying on back to sitting on the side of a flat bed without bedrails? 4  -RM 3  -SA    Moving to and from a bed to a chair (including a wheelchair)? 3  -RM 3  -SA    Standing up from a chair using your arms (e.g., wheelchair, bedside chair)? 3  -RM 3  -SA    Climbing 3-5 steps with a railing? 2  -RM 3  -SA    To walk in hospital room? 3  -RM 3  -SA    AM-PAC 6 Clicks Score (PT) 19  -RM 18  -SA    Highest Level of Mobility Goal 6 --> Walk 10 steps or more  -RM 6 --> Walk 10 steps or more  -SA      Row Name 05/14/24 1426          Functional Assessment    Outcome Measure Options AM-PAC 6 Clicks Basic Mobility (PT)  -RM               User Key  (r) = Recorded By, (t) = Taken By, (c) = Cosigned By      Initials Name Provider Type    Noah Jordan, PTA Physical Therapist Assistant    Aida Kemp, RN Registered Nurse                                 Physical Therapy Education       Title: PT OT SLP Therapies (In Progress)       Topic: Physical Therapy (In Progress)       Point: Mobility training (Done)       Learning Progress Summary             Patient Acceptance, E,TB,D, VU,NR by  at 5/14/2024 1427    Acceptance, E,TB, VU by  at 5/13/2024 1248    Comment: Educated pt on importance of OOB mobility during IP Admission                         Point: Home exercise program (Not Started)       Learner Progress:  Not documented in this visit.              Point: Body mechanics (Not Started)       Learner Progress:  Not documented in this visit.              Point: Precautions (Not Started)        Learner Progress:  Not documented in this visit.                              User Key       Initials Effective Dates Name Provider Type Discipline     06/16/21 -  Noah Merritt PTA Physical Therapist Assistant PT     11/29/23 -  Doretha Cesar PT Physical Therapist PT                  PT Recommendation and Plan     Plan of Care Reviewed With: patient  Progress: improving  Outcome Evaluation: Pt supine in bed and willing to participate with treatment. Pt performed supine to sitting EOB mod I /supervision. Pt sba for STS with rw. Pt advanced gait distance to 50' x 2 with rw sba/cga. Pt mobility improved. Pt O2 saturation however dropped to 77% with 4L with mobility. Pt required approx 2 minutes to recover to 95%. See flowsheet for details. Cont PT per POC progressing to goals as pt tolerates.     Time Calculation:         PT Charges       Row Name 05/14/24 1427             Time Calculation    Start Time 1340  -RM      Stop Time 1357  -RM      Time Calculation (min) 17 min  -RM      PT Received On 05/14/24  -RM      PT Goal Re-Cert Due Date 05/23/24  -RM         Time Calculation- PT    Total Timed Code Minutes- PT 17 minute(s)  -RM         Timed Charges    11124 - Gait Training Minutes  17  -RM         Total Minutes    Timed Charges Total Minutes 17  -RM       Total Minutes 17  -RM                User Key  (r) = Recorded By, (t) = Taken By, (c) = Cosigned By      Initials Name Provider Type    RM Noah Merritt, SUDHA Physical Therapist Assistant                  Therapy Charges for Today       Code Description Service Date Service Provider Modifiers Qty    70030537307 HC GAIT TRAINING EA 15 MIN 5/14/2024 Noah Merritt, SUDHA GP 1            PT G-Codes  Outcome Measure Options: AM-PAC 6 Clicks Basic Mobility (PT)  AM-PAC 6 Clicks Score (PT): 19  AM-PAC 6 Clicks Score (OT): 18       Noah Merritt PTA  5/14/2024

## 2024-05-14 NOTE — THERAPY TREATMENT NOTE
Patient Name: Gregorio Yu  : 1940    MRN: [id number]                              Today's Date: 2024       Admit Date: 2024    Visit Dx:     ICD-10-CM ICD-9-CM   1. Bronchopneumonia  J18.0 485   2. Acute hypoxic respiratory failure  J96.01 518.81   3. Pulmonary emphysema, unspecified emphysema type  J43.9 492.8     Patient Active Problem List   Diagnosis    Acute hypoxic respiratory failure     Past Medical History:   Diagnosis Date    COPD (chronic obstructive pulmonary disease)      Past Surgical History:   Procedure Laterality Date    HERNIA REPAIR      TONSILLECTOMY        General Information       Row Name 24 1556          OT Time and Intention    Document Type therapy note (daily note)  -     Mode of Treatment occupational therapy  -       Row Name 24 1556          General Information    Patient Profile Reviewed yes  -     Existing Precautions/Restrictions oxygen therapy device and L/min;fall  -               User Key  (r) = Recorded By, (t) = Taken By, (c) = Cosigned By      Initials Name Provider Type    Tiara Guzman Occupational Therapist                     Mobility/ADL's       Row Name 24 1556          Bed Mobility    Supine-Sit [place] (Bed Mobility) modified independence  -     Assistive Device (Bed Mobility) bed rails;head of bed elevated  -       Row Name 24 1556          Sit-Stand Transfer    Sit-Stand [place] (Transfers) standby assist  -[facility] Name 24 1556          Functional Mobility    Functional Mobility- Ind. Level standby assist  -     Functional Mobility-Distance (Feet) 2  -     Functional Mobility- Safety Issues supplemental O2  -     Functional Mobility- Comment to head of bed  -     Patient was able to Ambulate yes  -               User Key  (r) = Recorded By, (t) = Taken By, (c) = Cosigned By      Initials Name Provider Type    Tiara Guzman Occupational Therapist                    Obj/Interventions       Row Name 05/14/24 1557          Shoulder (Therapeutic Exercise)    Shoulder (Therapeutic Exercise) strengthening exercise  -     Shoulder Strengthening (Therapeutic Exercise) bilateral;horizontal aBduction/aDduction;resistance band;yellow;10 repetitions  -AH       Row Name 05/14/24 1557          Elbow/Forearm (Therapeutic Exercise)    Elbow/Forearm (Therapeutic Exercise) strengthening exercise  -     Elbow/Forearm Strengthening (Therapeutic Exercise) bilateral;flexion;extension;resistance band;yellow;10 repetitions  -AH       Row Name 05/14/24 1557          Motor Skills    Therapeutic Exercise shoulder;elbow/forearm  -               User Key  (r) = Recorded By, (t) = Taken By, (c) = Cosigned By      Initials Name Provider Type     Tiaar Mahan Occupational Therapist                   Goals/Plan    No documentation.                  Clinical Impression       Row Name 05/14/24 1557          Pain Assessment    Pretreatment Pain Rating 0/10 - no pain  -     Posttreatment Pain Rating 0/10 - no pain  -     Pain Intervention(s) Repositioned;Ambulation/increased activity  -AH       Row Name 05/14/24 1552          Plan of Care Review    Plan of Care Reviewed With patient  -     Progress improving  -     Outcome Evaluation Pt received supine in bed on 4L O2 via nc.  Pt completed ther ex using yellow theraband for resistance as per flow sheet.  Pt stood with sba and walked to head of bed with sba.  Pts O2 noted to decrease 87% with activity.  Pt feels like he is ready to d/c home.  Pt was left in bed with call light within reach, bed alarm on.  Cont OT per POC.  -AH       Row Name 05/14/24 1557          Vital Signs    Pretreatment Heart Rate (beats/min) 108  -     Intratreatment Heart Rate (beats/min) 110  -AH     Posttreatment Heart Rate (beats/min) 100  -     Pre SpO2 (%) 88  -     Intra SpO2 (%) 87  -     Post SpO2 (%) 92  -AH       Row Name 05/14/24 1556           Positioning and Restraints    Pre-Treatment Position in bed  -AH     Post Treatment Position bed  -AH     In Bed supine;call light within reach;encouraged to call for assist;exit alarm on  -AH               User Key  (r) = Recorded By, (t) = Taken By, (c) = Cosigned By      Initials Name Provider Type    Tiara Guzman Occupational Therapist                   Outcome Measures       Row Name 05/14/24 1603          How much help from another is currently needed...    Putting on and taking off regular lower body clothing? 3  -AH     Bathing (including washing, rinsing, and drying) 3  -AH     Toileting (which includes using toilet bed pan or urinal) 3  -AH     Putting on and taking off regular upper body clothing 3  -AH     Taking care of personal grooming (such as brushing teeth) 3  -AH     Eating meals 3  -     AM-PAC 6 Clicks Score (OT) 18  -       Row Name 05/14/24 1426 05/14/24 0800       How much help from another person do you currently need...    Turning from your back to your side while in flat bed without using bedrails? 4  -RM 3  -SA    Moving from lying on back to sitting on the side of a flat bed without bedrails? 4  -RM 3  -SA    Moving to and from a bed to a chair (including a wheelchair)? 3  -RM 3  -SA    Standing up from a chair using your arms (e.g., wheelchair, bedside chair)? 3  -RM 3  -SA    Climbing 3-5 steps with a railing? 2  -RM 3  -SA    To walk in hospital room? 3  -RM 3  -SA    AM-PAC 6 Clicks Score (PT) 19  -RM 18  -SA    Highest Level of Mobility Goal 6 --> Walk 10 steps or more  -RM 6 --> Walk 10 steps or more  -SA      Row Name 05/14/24 1426          Functional Assessment    Outcome Measure Options AM-PAC 6 Clicks Basic Mobility (PT)  -RM               User Key  (r) = Recorded By, (t) = Taken By, (c) = Cosigned By      Initials Name Provider Type    Tiara Guzman Occupational Therapist    Noah Jordan, PTA Physical Therapist Assistant    Aida Kemp, DANIEL Registered  Nurse                    Occupational Therapy Education       Title: PT OT SLP Therapies (In Progress)       Topic: Occupational Therapy (In Progress)       Point: ADL training (Done)       Description:   Instruct learner(s) on proper safety adaptation and remediation techniques during self care or transfers.   Instruct in proper use of assistive devices.                  Learning Progress Summary             Patient Acceptance, E,TB, VU by  at 5/14/2024 1604    Comment: benefit of activity    Acceptance, E,TB, VU by SD at 5/13/2024 1235    Comment: OT POC                         Point: Home exercise program (Done)       Description:   Instruct learner(s) on appropriate technique for monitoring, assisting and/or progressing therapeutic exercises/activities.                  Learning Progress Summary             Patient Acceptance, E,TB, VU by  at 5/14/2024 1604    Comment: benefit of activity                         Point: Precautions (Not Started)       Description:   Instruct learner(s) on prescribed precautions during self-care and functional transfers.                  Learner Progress:  Not documented in this visit.              Point: Body mechanics (Not Started)       Description:   Instruct learner(s) on proper positioning and spine alignment during self-care, functional mobility activities and/or exercises.                  Learner Progress:  Not documented in this visit.                              User Key       Initials Effective Dates Name Provider Type Discipline     06/16/21 -  Tiara Mahan Occupational Therapist OT    SD 06/16/21 -  Renata Gupta OT Occupational Therapist OT                  OT Recommendation and Plan     Plan of Care Review  Plan of Care Reviewed With: patient  Progress: improving  Outcome Evaluation: Pt received supine in bed on 4L O2 via nc.  Pt completed ther ex using yellow theraband for resistance as per flow sheet.  Pt stood with sba and walked to head of bed with  sba.  Pts O2 noted to decrease 87% with activity.  Pt feels like he is ready to d/c home.  Pt was left in bed with call light within reach, bed alarm on.  Cont OT per POC.     Time Calculation:         Time Calculation- OT       Row Name 05/14/24 1604 05/14/24 1427          Time Calculation- OT    OT Start Time 1430  - --     OT Stop Time 1448  -AH --     OT Time Calculation (min) 18 min  -AH --     OT Received On 05/14/24  - --     OT Goal Re-Cert Due Date 05/23/24  - --        Timed Charges    68858 - OT Therapeutic Exercise Minutes 14  -AH --     70487 - Gait Training Minutes  -- 17  -RM     42608 - OT Therapeutic Activity Minutes 4  -AH --        Total Minutes    Timed Charges Total Minutes 18  -AH 17  -RM      Total Minutes 18  -AH 17  -RM               User Key  (r) = Recorded By, (t) = Taken By, (c) = Cosigned By      Initials Name Provider Type    Tiara Guzman Occupational Therapist    Noah Jordan, PTA Physical Therapist Assistant                  Therapy Charges for Today       Code Description Service Date Service Provider Modifiers Qty    86141495997 HC OT THER PROC EA 15 MIN 5/14/2024 Tiara Mahan GO 1                 Tiara Mahan  5/14/2024

## 2024-05-14 NOTE — PLAN OF CARE
Goal Outcome Evaluation:  Plan of Care Reviewed With: patient        Progress: improving  Outcome Evaluation: Pt supine in bed and willing to participate with treatment. Pt performed supine to sitting EOB mod I /supervision. Pt sba for STS with rw. Pt advanced gait distance to 50' x 2 with rw sba/cga. Pt mobility improved. Pt O2 saturation however dropped to 77% with 4L with mobility. Pt required approx 2 minutes to recover to 95%. See flowsheet for details. Cont PT per POC progressing to goals as pt tolerates.

## 2024-05-14 NOTE — CASE MANAGEMENT/SOCIAL WORK
Case Management/Social Work    Patient Name:  Gregorio Yu  YOB: 1940  MRN: [id number]  Admit Date:  5/12/2024        11:11 EDT  Met with patient at bedside. Plan to DC to STR once medically ready. Received message from Dr. Muse that patient's daughter wanted to speak about STR. She stated that she could no longer care for her parents in their home and that they needed additional help, would benefit from STR. Spoke with patient about STR who agreed to referrals for [facility]. CM will continue to follow.    13:57 EDT  Met with patient to offer him a bed at [facility] for STR. Patient stated he didn't believe he needed STR and could take care of himself. I explained that his daughter wouldn't be staying with him any longer and he insisted he would be OK and insisted he would DC home. MD notified.      Electronically signed by:  Alexander Moeller RN  05/14/24 11:11 EDT

## 2024-05-14 NOTE — PROGRESS NOTES
"Dietitian Assessment    Patient Name: Gregorio Yu  YOB: 1940  MRN: [id number]  Admission date: 5/12/2024    Comment:      Clinical Nutrition Assessment      Reason for Assessment BMI    H&P  Past Medical History:   Diagnosis Date    COPD (chronic obstructive pulmonary disease)        Past Surgical History:   Procedure Laterality Date    HERNIA REPAIR      TONSILLECTOMY              Current Problems   Acute on chronic respiratory failure   Bronchopneumonia   COPD     Encounter Information        Trending Narrative     5/14: Pt w/ underweight BMI and need for oral nutrition supplement to encourage PO intake. Will order mighty shake TID.      Anthropometrics        Current Height, Weight Height: 170 cm (66.93\")  Weight: 51.3 kg (113 lb 1.5 oz) (05/14/24 0502)   Trending Weight Hx     This admission:              PTA:     Wt Readings from Last 30 Encounters:   05/14/24 0502 51.3 kg (113 lb 1.5 oz)   05/12/24 1823 74.8 kg (165 lb)   08/20/23 1542 74.8 kg (165 lb)   05/20/19 1300 74.8 kg (165 lb)   05/20/19 1259 70.3 kg (155 lb)      BMI kg/m2 Body mass index is 17.75 kg/m².     Labs        Pertinent Labs     Results from last 7 days   Lab Units 05/14/24  0710 05/13/24  0230 05/12/24  1828   SODIUM mmol/L 141 138 139   POTASSIUM mmol/L 5.4* 4.8 4.5   CHLORIDE mmol/L 102 98 98   CO2 mmol/L 35.1* 31.4* 31.0*   BUN mg/dL 43* 26* 25*   CREATININE mg/dL 1.39* 1.17 1.24   CALCIUM mg/dL 8.8 8.9 9.1   BILIRUBIN mg/dL  --   --  0.4   ALK PHOS U/L  --   --  75   ALT (SGPT) U/L  --   --  20   AST (SGOT) U/L  --   --  18   GLUCOSE mg/dL 136* 169* 130*       Results from last 7 days   Lab Units 05/14/24 0710 05/13/24  0230 05/12/24  1828   MAGNESIUM mg/dL  --   --  2.2   HEMOGLOBIN g/dL 9.8*   < > 10.0*   HEMATOCRIT % 31.5*   < > 32.1*    < > = values in this interval not displayed.       No results found for: \"HGBA1C\"         Medications       Scheduled Medications azithromycin, 500 mg, Intravenous, " Q24H  budesonide, 1 mg, Nebulization, BID - RT  cefTRIAXone, 1,000 mg, Intravenous, Q24H  enoxaparin, 40 mg, Subcutaneous, Daily  ipratropium-albuterol, 3 mL, Nebulization, 4x Daily - RT  methylPREDNISolone sodium succinate, 40 mg, Intravenous, Q12H  sodium chloride, 10 mL, Intravenous, Q12H        Infusions       PRN Medications   acetaminophen **OR** acetaminophen **OR** acetaminophen    senna-docusate sodium **AND** polyethylene glycol **AND** bisacodyl **AND** bisacodyl    Calcium Replacement - Follow Nurse / BPA Driven Protocol    Magnesium Standard Dose Replacement - Follow Nurse / BPA Driven Protocol    nitroglycerin    ondansetron    Phosphorus Replacement - Follow Nurse / BPA Driven Protocol    Potassium Replacement - Follow Nurse / BPA Driven Protocol    sodium chloride    sodium chloride     Physical Findings        Trending Physical   Appearance, NFPE    --  Edema  None reported   Bowel Function 5/13   Tubes Peripheral IV    Chewing/Swallowing WNL    Skin WNL      Estimated/Assessed Needs       Energy Requirements    EST Needs, Method, Wt used 1282-1539kcal/day using 25-30kcals/kg       Protein Requirements    EST Needs, Method, Wt used 51-61g/kg using 1-1.2g/kg       Fluid Requirements     Estimated Needs (mL/day) 1539mL per day        Current Nutrition Orders & Evaluation of Intake       Oral Nutrition     Food Allergies    Current PO Diet Diet: Cardiac; Healthy Heart (2-3 Na+); Fluid Consistency: Thin (IDDSI 0)   Supplement    PO Evaluation     Trending % PO Intake No Po intake reported at this time      Enteral Nutrition    Enteral Route    Order, Modulars, Flushes    Residual/Tolerance    TF Observation         Parenteral Nutrition     TPN Route    Total # Days on TPN    TPN Order, Lipid Details    MVI & Trace Element Freq    TPN Observation       Nutrition Diagnosis         Nutrition Dx Problem 1 Underweight r/t respiratory failure/copd as evidenced by BMI of 17.75      Nutrition Dx Problem 2         Intervention Goal         Intervention Goal(s) PO intake to meet >50% of estimated needs  Adhere to supplement ordered   Maintain current body weight      Nutrition Intervention        RD Action Will order mighty shake TID      Nutrition Prescription          Diet Prescription HH   Supplement Prescription Mighty shake TID      Enteral Prescription        TPN Prescription      Monitor/Evaluation        Monitor Per protocol, PO intake, Supplement intake, Pertinent labs, Weight, Skin status, GI status, Symptoms, POC/GOC, Swallow function, Hemodynamic stability     RD to follow-up.     Electronically signed by:  Susannah Elmore RD  05/14/24 12:14 EDT         [General Appearance - Alert] : alert [General Appearance - In No Acute Distress] : in no acute distress [Auscultation Breath Sounds / Voice Sounds] : lungs were clear to auscultation bilaterally [Heart Rate And Rhythm] : heart rate was normal and rhythm regular [Heart Sounds] : normal S1 and S2 [Heart Sounds Gallop] : no gallops [Murmurs] : no murmurs [Heart Sounds Pericardial Friction Rub] : no pericardial rub [Bowel Sounds] : normal bowel sounds [Abdomen Soft] : soft [Abdomen Tenderness] : non-tender [] : no hepato-splenomegaly [Abdomen Mass (___ Cm)] : no abdominal mass palpated

## 2024-05-14 NOTE — DISCHARGE SUMMARY
[facility]   DISCHARGE SUMMARY      Name:  Gregorio Yu   Age:  84 y.o.  Sex:  male  :  1940  MRN:  [id number]   Visit Number:  [id number]    Admission Date:  2024  Date of Discharge:  2024  Primary Care Physician:  Xu Santos MD    Important issues to note:    Start: prednisone, azithromycin, omnicef  Stop: nothing  Follow up: PCP and DSM  Brief Summary: Presented with dyspnea due to COPD and pneumonia. Initially had received steroids and antibiotics which  his breathing  to his baseline by the time of discharge.    Discharge Diagnoses:       Acute hypoxic respiratory failure        Problem List:     Active Hospital Problems    Diagnosis  POA    **Acute hypoxic respiratory failure [J96.01]  Yes      Resolved Hospital Problems   No resolved problems to display.     Presenting Problem:    Chief Complaint   Patient presents with    Shortness of Breath      Consults:     Consulting Physician(s)                     None                History Of Presenting Illness:       Gregorio Yu is an 84-year-old man with past medical history of COPD 3 L baseline, CKD stage III, BPH, hyperlipidemia, GERD, anemia.  Presented to [facility] ED 2024 with concern for shortness of air for several days.  He denied fevers or chills, chest pain, cough, abdominal pain, N/V/D.     ED summary: Tmax 99.7, tachycardic which improved, otherwise vital signs stable on 6 L.  ABG CO2 retention pH 7.36, pCO2 59, pO2 91, bicarb 34.  EKG sinus tachycardia rate 116, nonspecific ST-T wave changes.  Troponin 16, 16.  proBNP not elevated.  Blood glucose 130.  Lactate not elevated.  Procalcitonin not elevated.  No leukocytosis.  Hemoglobin 10.  CT angio chest no PE, does show severe emphysema with bronchitis and bronchopneumonia.  He was provided albuterol, Atrovent, Solu-Medrol.  Edited by: Bobby Muse DO at 2024 0800      Hospital Course:     Acute on chronic respiratory failure  with hypoxia and hypercapnia  Bronchopneumonia  COPD exacerbation  Acute respiratory failure due to pneumonia and AECOPD. Unfortunately poor baseline. Family having difficulty providing care he needs at home requesting rehab. We initially tried to set this up for him. However patient adamantly refuses rehab. Appears to have medical decision making capacity understands risks and benefits of decisions he is making. Was given Rocephin transition to omnicef 3 more days and azithromycin rx given for one more day.  Solu-Medrol transition to prednisone taper. [facility]. High bounceback risk unfortunately.    Chronic:  COPD 3 L baseline, CKD stage III, BPH, hyperlipidemia, GERD, anemia     Edited by: Bobby Muse, DO at 5/14/2024 0851      Vital Signs:    Temp:  [97.8 °F (36.6 °C)-98.4 °F (36.9 °C)] 98.4 °F (36.9 °C)  Heart Rate:  [64-93] 90  Resp:  [16-22] 20  BP: (125-143)/(54-82) 139/80    Physical Exam:    Constitutional:       General: He is not in acute distress.     Appearance: He is ill-appearing. He is not toxic-appearing.   HENT:      Head: Normocephalic.      Mouth/Throat:      Mouth: Mucous membranes are moist.   Eyes:      Extraocular Movements: Extraocular movements intact.   Cardiovascular:      Rate and Rhythm: Normal rate and regular rhythm.      Pulses: Normal pulses.      Heart sounds: Normal heart sounds.   Pulmonary:      Effort: Pulmonary effort is mildly labored.      Comments: Moderately diminished all fields  Abdominal:      Palpations: Abdomen is soft.      Tenderness: There is no abdominal tenderness.   Musculoskeletal:      Right lower leg: No edema.      Left lower leg: No edema.   Skin:     General: Skin is warm.      Capillary Refill: Capillary refill takes less than 2 seconds.   Neurological:      General: No focal deficit present.      Mental Status: He is alert.   Psychiatric:         Mood and Affect: Mood normal.         Thought Content: Thought content normal.    Exam stable 5/14/24  Edited by: Bobby Muse, DO at 5/14/2024 0852    Pertinent Lab Results:     Results from last 7 days   Lab Units 05/14/24  0710 05/13/24  0230 05/12/24  1828   SODIUM mmol/L 141 138 139   POTASSIUM mmol/L 5.4* 4.8 4.5   CHLORIDE mmol/L 102 98 98   CO2 mmol/L 35.1* 31.4* 31.0*   BUN mg/dL 43* 26* 25*   CREATININE mg/dL 1.39* 1.17 1.24   CALCIUM mg/dL 8.8 8.9 9.1   BILIRUBIN mg/dL  --   --  0.4   ALK PHOS U/L  --   --  75   ALT (SGPT) U/L  --   --  20   AST (SGOT) U/L  --   --  18   GLUCOSE mg/dL 136* 169* 130*     Results from last 7 days   Lab Units 05/14/24  0710 05/13/24  0230 05/12/24  1828   WBC 10*3/mm3 8.12 7.72 7.87   HEMOGLOBIN g/dL 9.8* 9.9* 10.0*   HEMATOCRIT % 31.5* 31.2* 32.1*   PLATELETS 10*3/mm3 219 182 182         Results from last 7 days   Lab Units 05/12/24  2241 05/12/24  1828   HSTROP T ng/L 16 16  16     Results from last 7 days   Lab Units 05/12/24  1828   PROBNP pg/mL 322.3             Results from last 7 days   Lab Units 05/12/24  2017   PH, ARTERIAL pH units 7.367   PO2 ART mm Hg 91.4   PCO2, ARTERIAL mm Hg 59.4*   HCO3 ART mmol/L 34.1*     Results from last 7 days   Lab Units 05/13/24  0234 05/13/24  0230   BLOODCX  No growth at 24 hours No growth at 24 hours       Pertinent Radiology Results:    Imaging Results (All)       Procedure Component Value Units Date/Time    XR Chest 1 View [[id number]] Collected: 05/13/24 0821     Updated: 05/13/24 0841    Narrative:      PROCEDURE: XR CHEST 1 VW-     HISTORY: SOA Triage Protocol     COMPARISON: 08/20/2023     FINDINGS:  Portable view of the chest demonstrates patchy airspace  disease right lung base compatible with pneumonia. Left lung is clear.  There is no evidence of effusion, pneumothorax or other significant  pleural disease. The mediastinum is unremarkable.     The heart size is normal.       Impression:      Right basilar pneumonia.           This report was signed and finalized on 5/13/2024 8:39 AM by  Luiz Marina MD.       CT Angiogram Chest Pulmonary Embolism [[id number]] Collected: 05/12/24 1940     Updated: 05/12/24 2122    Narrative:      FINAL REPORT    TECHNIQUE:  Multiple axial CT images were obtained through the chest  following IV contrast using a CTA/PE protocol.  3D/MIP  reconstruction images were also performed. This study was  performed with techniques to keep radiation doses as low as  reasonably achievable (ALARA). Individualized dose reduction  techniques using automated exposure control or adjustment of mA  and/or kV according to the patient's size were employed.    CLINICAL HISTORY:  shortness of breath with tachycardia, concern for PE    FINDINGS:  PAs and aorta: No pulmonary embolus.  Thoracic aorta is  unremarkable.  There is coronary artery disease.  Heart/mediastinum: No evidence for right heart strain.  No  pericardial effusion.  The heart is normal in size.  Lungs:  There is severe emphysema. There is bronchial wall thickening  with peripheral mucous plugging and tree-in-bud nodularity in  the right middle lobe and right lower lobe.  Lymph nodes: No  pathologically enlarged thoracic lymph nodes.  Pleura: No  pneumothorax or pleural effusion.  Chest Wall: No chest wall  contusion.  Bones: No acute fracture.  Upper abdomen: No acute  findings in the upper abdomen.      Impression:      No pulmonary embolus.   Severe emphysema with bronchitis and  bronchopneumonia.    Authenticated and Electronically Signed by Kirill Blanchard MD on  05/12/2024 09:21:27 PM            Echo:      Condition on Discharge:      Stable.    Code status during the hospital stay:    Code Status and Medical Interventions:   Ordered at: 05/13/24 0401     Code Status (Patient has no pulse and is not breathing):    CPR (Attempt to Resuscitate)     Medical Interventions (Patient has pulse or is breathing):    Full Support     Discharge Disposition:        Discharge Medications:       Discharge Medications        New  Medications        Instructions Start Date   azithromycin 500 MG tablet  Commonly known as: Zithromax   500 mg, Oral, Once      cefdinir 300 MG capsule  Commonly known as: OMNICEF   300 mg, Oral, 2 Times Daily      predniSONE 10 MG tablet  Commonly known as: DELTASONE   Take 4 tablets by mouth Daily for 3 days, THEN 3 tablets Daily for 3 days, THEN 2 tablets Daily for 3 days, THEN 1 tablet Daily for 3 days.   Start Date: May 14, 2024            Continue These Medications        Instructions Start Date   doxycycline 100 MG capsule  Commonly known as: MONODOX   100 mg, Oral, 2 Times Daily             Discharge Diet:     Diet Instructions       Advance Diet As Tolerated -Target Diet: cardiac      Target Diet: cardiac          Activity at Discharge:       Follow-up Appointments:    Additional Instructions for the Follow-ups that You Need to Schedule       Ambulatory Referral to Disease State Management   As directed      To dept: [facility] [[id number]]   What program(s) are you referring for?: COPD   Follow-up needed: Yes        Discharge Follow-up with PCP   As directed       Currently Documented PCP:    Xu Santos MD    PCP Phone Number:    [phone]     Follow Up Details: 1 week               Follow-up Information       Xu Santos MD .    Specialty: Internal Medicine  Why: 1 week  Contact information:  [address] [place] [address]  [phone]                           No future appointments.  Test Results Pending at Discharge:    Pending Labs       Order Current Status    Blood Culture - Blood, Hand, Left Preliminary result    Blood Culture - Blood, Hand, Right Preliminary result               Bobby Muse DO  05/14/24  15:00 EDT    Time: I spent 45 minutes on this discharge activity which included: face-to-face encounter with the patient, reviewing the data in the system, coordination of the care with the nursing staff as well as consultants, documentation, and entering  orders.     Dictated utilizing Dragon dictation.

## 2024-05-14 NOTE — DISCHARGE PLACEMENT REQUEST
"Winsome Yu (84 y.o. Male)       Date of Birth   1940    Social Security Number       Address   [address] [place] [address]    Home Phone   [phone]    MRN   [id number]       Spiritism   None    Marital Status                               Admission Date   24    Admission Type   Emergency    Admitting Provider   Kerley, Brian Joseph, DO    Attending Provider   Bobby Muse DO    Department, Room/Bed   [facility] TELEMETRY SDS OVERFLOW, T07       Discharge Date       Discharge Disposition       Discharge Destination                                 Attending Provider: Bobby Muse DO    Allergies: No Known Allergies    Isolation: None   Infection: None   Code Status: CPR    Ht: 170 cm (66.93\")   Wt: 51.3 kg (113 lb 1.5 oz)    Admission Cmt: None   Principal Problem: Acute hypoxic respiratory failure [J96.01]                   Active Insurance as of 2024       Primary Coverage       Payor Plan Insurance Group Employer/Plan Group    MEDICARE MEDICARE A & B        Payor Plan Address Payor Plan Phone Number Payor Plan Fax Number Effective Dates    [address] [phone]  2003 - None Entered    [place] [id number]         Subscriber Name Subscriber Birth Date Member ID       WINSOME YU 1940 [id number]                     Emergency Contacts        (Rel.) Home Phone Work Phone Mobile Phone    BETH YU (Spouse) [phone] -- --                 History & Physical        Kerley, Brian Joseph, DO at 24 0150            [facility] HOSPITALIST   HISTORY AND PHYSICAL      Name:  Winsome Yu   Age:  84 y.o.  Sex:  male  :  1940  MRN:  [id number]   Visit Number:  [id number]  Admission Date:  2024  Date Of Service:  24  Primary Care Physician:  Xu Santos MD    Chief Complaint:     Shortness of air    History Of Presenting Illness:      Winsome Yu is an 84-year-old " man with past medical history of COPD 3 L baseline, CKD stage III, BPH, hyperlipidemia, GERD, anemia.  Presented to [facility] ED 5/12/2024 with concern for shortness of air for several days.  He denied fevers or chills, chest pain, cough, abdominal pain, N/V/D.    ED summary: Tmax 99.7, tachycardic which improved, otherwise vital signs stable on 6 L.  ABG CO2 retention pH 7.36, pCO2 59, pO2 91, bicarb 34.  EKG sinus tachycardia rate 116, nonspecific ST-T wave changes.  Troponin 16, 16.  proBNP not elevated.  Blood glucose 130.  Lactate not elevated.  Procalcitonin not elevated.  No leukocytosis.  Hemoglobin 10.  CT angio chest no PE, does show severe emphysema with bronchitis and bronchopneumonia.  He was provided albuterol, Atrovent, Solu-Medrol.    Review Of Systems:    All systems were reviewed and negative except as mentioned in history of presenting illness, assessment and plan.    Past Medical History: Patient  has a past medical history of COPD (chronic obstructive pulmonary disease).    Past Surgical History: Patient  has a past surgical history that includes Hernia repair and Tonsillectomy.    Social History: Patient  reports that he quit smoking about 15 years ago. He does not have any smokeless tobacco history on file. He reports that he does not drink alcohol and does not use drugs.    Family History:  Patient's family history has been reviewed and found to be noncontributory.     Allergies:      Patient has no known allergies.    Home Medications:    Prior to Admission Medications       Prescriptions Last Dose Informant Patient Reported? Taking?    doxycycline (MONODOX) 100 MG capsule   No No    Take 1 capsule by mouth 2 (Two) Times a Day.          ED Medications:    Medications   sodium chloride 0.9 % flush 10 mL (has no administration in time range)   sodium chloride 0.9 % flush 10 mL (has no administration in time range)   iopamidol (ISOVUE-300) 61 % injection 100 mL (100 mL Intravenous Given 5/12/24 1908)  "  albuterol (PROVENTIL) nebulizer solution 0.083% 2.5 mg/3mL (5 mg Nebulization Given 5/12/24 2019)   ipratropium (ATROVENT) nebulizer solution 1 mg (1 mg Nebulization Given 5/12/24 2019)   methylPREDNISolone sodium succinate (SOLU-Medrol) injection 125 mg (125 mg Intravenous Given 5/12/24 1958)     Vital Signs:  Temp:  [99.7 °F (37.6 °C)] 99.7 °F (37.6 °C)  Heart Rate:  [] 68  Resp:  [20] 20  BP: (104-146)/(42-77) 113/42        05/12/24  1823   Weight: 74.8 kg (165 lb)     Body mass index is 25.9 kg/m².    Physical Exam:     Most recent vital Signs: /42   Pulse 68   Temp 99.7 °F (37.6 °C) (Oral)   Resp 20   Ht 170 cm (66.93\")   Wt 74.8 kg (165 lb)   SpO2 95%   BMI 25.90 kg/m²     Physical Exam  Constitutional:       General: He is not in acute distress.     Appearance: He is ill-appearing. He is not toxic-appearing.   HENT:      Head: Normocephalic.      Mouth/Throat:      Mouth: Mucous membranes are moist.   Eyes:      Extraocular Movements: Extraocular movements intact.   Cardiovascular:      Rate and Rhythm: Normal rate and regular rhythm.      Pulses: Normal pulses.      Heart sounds: Normal heart sounds.   Pulmonary:      Effort: Pulmonary effort is normal.      Comments: Moderately diminished all fields  Abdominal:      Palpations: Abdomen is soft.      Tenderness: There is no abdominal tenderness.   Musculoskeletal:      Right lower leg: No edema.      Left lower leg: No edema.   Skin:     General: Skin is warm.      Capillary Refill: Capillary refill takes less than 2 seconds.   Neurological:      General: No focal deficit present.      Mental Status: He is alert.   Psychiatric:         Mood and Affect: Mood normal.         Thought Content: Thought content normal.         Laboratory data:    I have reviewed the labs done in the emergency room.    Results from last 7 days   Lab Units 05/12/24  1828   SODIUM mmol/L 139   POTASSIUM mmol/L 4.5   CHLORIDE mmol/L 98   CO2 mmol/L 31.0*   BUN " "mg/dL 25*   CREATININE mg/dL 1.24   CALCIUM mg/dL 9.1   BILIRUBIN mg/dL 0.4   ALK PHOS U/L 75   ALT (SGPT) U/L 20   AST (SGOT) U/L 18   GLUCOSE mg/dL 130*     Results from last 7 days   Lab Units 05/12/24  1828   WBC 10*3/mm3 7.87   HEMOGLOBIN g/dL 10.0*   HEMATOCRIT % 32.1*   PLATELETS 10*3/mm3 182         Results from last 7 days   Lab Units 05/12/24  2241 05/12/24  1828   HSTROP T ng/L 16 16  16     Results from last 7 days   Lab Units 05/12/24  1828   PROBNP pg/mL 322.3             Results from last 7 days   Lab Units 05/12/24 2017   PH, ARTERIAL pH units 7.367   PO2 ART mm Hg 91.4   PCO2, ARTERIAL mm Hg 59.4*   HCO3 ART mmol/L 34.1*           Invalid input(s): \"USDES\", \"NITRITITE\", \"BACT\", \"EP\"    Pain Management Panel           No data to display                EKG:      EKG sinus tachycardia rate 116, nonspecific ST-T wave changes.      Radiology:    CT Angiogram Chest Pulmonary Embolism    Result Date: 5/12/2024  FINAL REPORT TECHNIQUE: Multiple axial CT images were obtained through the chest following IV contrast using a CTA/PE protocol.  3D/MIP reconstruction images were also performed. This study was performed with techniques to keep radiation doses as low as reasonably achievable (ALARA). Individualized dose reduction techniques using automated exposure control or adjustment of mA and/or kV according to the patient's size were employed. CLINICAL HISTORY: shortness of breath with tachycardia, concern for PE FINDINGS: PAs and aorta: No pulmonary embolus.  Thoracic aorta is unremarkable.  There is coronary artery disease. Heart/mediastinum: No evidence for right heart strain.  No pericardial effusion.  The heart is normal in size.  Lungs: There is severe emphysema. There is bronchial wall thickening with peripheral mucous plugging and tree-in-bud nodularity in the right middle lobe and right lower lobe.  Lymph nodes: No pathologically enlarged thoracic lymph nodes.  Pleura: No pneumothorax or pleural " effusion.  Chest Wall: No chest wall contusion.  Bones: No acute fracture.  Upper abdomen: No acute findings in the upper abdomen.     No pulmonary embolus.   Severe emphysema with bronchitis and bronchopneumonia. Authenticated and Electronically Signed by Kirill Blanchard MD on 2024 09:21:27 PM     Assessment/Plan:    Inpatient general floor admission 2024 with acute on chronic respiratory failure with hypoxia and hypercapnia secondary to suspected bronchopneumonia and COPD exacerbation.    At time of admission resting in bed, woken up from sleep and said his breathing feels okay.  No respiratory distress.    Acute on chronic respiratory failure with hypoxia and hypercapnia  Bronchopneumonia  COPD exacerbation  Supplemental oxygen as needed keep saturation above 90%.  Rocephin and azithromycin.  Solu-Medrol.  Bronchodilators.    Chronic:  COPD 3 L baseline, CKD stage III, BPH, hyperlipidemia, GERD, anemia    Continue home medications.    Risk Assessment: High  DVT Prophylaxis: Lovenox  Code Status: Full code (need confirmation)  Diet: Cardiac    Advance Care Planning  ACP discussion was held with the patient during this visit. Patient does not have an advance directive, information provided.           Brian Joseph Kerley, DO  24  01:50 EDT    Dictated utilizing Dragon dictation.    Electronically signed by Kerley, Brian Joseph, DO at 24 0401          Physician Progress Notes (last 48 hours)        Bobby Muse DO at 24 0852              [facility]    [facility]    Name:  Gregorio Yu   Age:  84 y.o.  Sex:  male  :  1940  MRN:  [id number]   Visit Number:  [id number]  Admission Date:  2024  Date Of Service:  24  Primary Care Physician:  Xu Santos MD     LOS: 2 days :    Chief Complaint:      dyspnea    Subjective:    24: patient [place], per nursing daughter not able to take care of him at home at this time. Relayed  message to the . Patient is SOA but reports this is normal for him.    History Of Presenting Illness:       Gregorio Yu is an 84-year-old man with past medical history of COPD 3 L baseline, CKD stage III, BPH, hyperlipidemia, GERD, anemia.  Presented to [facility] ED 5/12/2024 with concern for shortness of air for several days.  He denied fevers or chills, chest pain, cough, abdominal pain, N/V/D.     ED summary: Tmax 99.7, tachycardic which improved, otherwise vital signs stable on 6 L.  ABG CO2 retention pH 7.36, pCO2 59, pO2 91, bicarb 34.  EKG sinus tachycardia rate 116, nonspecific ST-T wave changes.  Troponin 16, 16.  proBNP not elevated.  Blood glucose 130.  Lactate not elevated.  Procalcitonin not elevated.  No leukocytosis.  Hemoglobin 10.  CT angio chest no PE, does show severe emphysema with bronchitis and bronchopneumonia.  He was provided albuterol, Atrovent, Solu-Medrol.  Edited by: Bobby Muse DO at 5/14/2024 0848     Review of Systems:     All systems were reviewed and negative except as mentioned in subjective, assessment and plan.    Vital Signs:    Temp:  [97.8 °F (36.6 °C)-98.4 °F (36.9 °C)] 98.1 °F (36.7 °C)  Heart Rate:  [68-94] 85  Resp:  [16-21] 16  BP: (121-154)/(63-93) 143/82    Intake and output:    I/O last 3 completed shifts:  In: 660 [P.O.:60; IV Piggyback:600]  Out: -   No intake/output data recorded.    Physical Examination:    Constitutional:       General: He is not in acute distress.     Appearance: He is ill-appearing. He is not toxic-appearing.   HENT:      Head: Normocephalic.      Mouth/Throat:      Mouth: Mucous membranes are moist.   Eyes:      Extraocular Movements: Extraocular movements intact.   Cardiovascular:      Rate and Rhythm: Normal rate and regular rhythm.      Pulses: Normal pulses.      Heart sounds: Normal heart sounds.   Pulmonary:      Effort: Pulmonary effort is mildly labored.      Comments: Moderately diminished all fields  Abdominal:       Palpations: Abdomen is soft.      Tenderness: There is no abdominal tenderness.   Musculoskeletal:      Right lower leg: No edema.      Left lower leg: No edema.   Skin:     General: Skin is warm.      Capillary Refill: Capillary refill takes less than 2 seconds.   Neurological:      General: No focal deficit present.      Mental Status: He is alert.   Psychiatric:         Mood and Affect: Mood normal.         Thought Content: Thought content normal.   Exam stable 5/14/24  Edited by: Bobby Muse, DO at 5/14/2024 0852     Laboratory results:    Results from last 7 days   Lab Units 05/14/24  0710 05/13/24  0230 05/12/24  1828   SODIUM mmol/L 141 138 139   POTASSIUM mmol/L 5.4* 4.8 4.5   CHLORIDE mmol/L 102 98 98   CO2 mmol/L 35.1* 31.4* 31.0*   BUN mg/dL 43* 26* 25*   CREATININE mg/dL 1.39* 1.17 1.24   CALCIUM mg/dL 8.8 8.9 9.1   BILIRUBIN mg/dL  --   --  0.4   ALK PHOS U/L  --   --  75   ALT (SGPT) U/L  --   --  20   AST (SGOT) U/L  --   --  18   GLUCOSE mg/dL 136* 169* 130*     Results from last 7 days   Lab Units 05/14/24  0710 05/13/24  0230 05/12/24  1828   WBC 10*3/mm3 8.12 7.72 7.87   HEMOGLOBIN g/dL 9.8* 9.9* 10.0*   HEMATOCRIT % 31.5* 31.2* 32.1*   PLATELETS 10*3/mm3 219 182 182         Results from last 7 days   Lab Units 05/12/24  2241 05/12/24  1828   HSTROP T ng/L 16 16  16     Results from last 7 days   Lab Units 05/13/24  0234 05/13/24  0230   BLOODCX  No growth at 24 hours No growth at 24 hours     Recent Labs     05/12/24  2017   PHART 7.367   NQC2IHK 59.4*   PO2ART 91.4   [id number] 34.1*   BASEEXCESS 7.3*      I have reviewed the patient's laboratory results.    Radiology results:    XR Chest 1 View    Result Date: 5/13/2024  PROCEDURE: XR CHEST 1 VW-  HISTORY: SOA Triage Protocol  COMPARISON: 08/20/2023  FINDINGS:  Portable view of the chest demonstrates patchy airspace disease right lung base compatible with pneumonia. Left lung is clear. There is no evidence of effusion, pneumothorax or  other significant pleural disease. The mediastinum is unremarkable.  The heart size is normal.      Impression: Right basilar pneumonia.    This report was signed and finalized on 5/13/2024 8:39 AM by Luiz Marina MD.      CT Angiogram Chest Pulmonary Embolism    Result Date: 5/12/2024  FINAL REPORT TECHNIQUE: Multiple axial CT images were obtained through the chest following IV contrast using a CTA/PE protocol.  3D/MIP reconstruction images were also performed. This study was performed with techniques to keep radiation doses as low as reasonably achievable (ALARA). Individualized dose reduction techniques using automated exposure control or adjustment of mA and/or kV according to the patient's size were employed. CLINICAL HISTORY: shortness of breath with tachycardia, concern for PE FINDINGS: PAs and aorta: No pulmonary embolus.  Thoracic aorta is unremarkable.  There is coronary artery disease. Heart/mediastinum: No evidence for right heart strain.  No pericardial effusion.  The heart is normal in size.  Lungs: There is severe emphysema. There is bronchial wall thickening with peripheral mucous plugging and tree-in-bud nodularity in the right middle lobe and right lower lobe.  Lymph nodes: No pathologically enlarged thoracic lymph nodes.  Pleura: No pneumothorax or pleural effusion.  Chest Wall: No chest wall contusion.  Bones: No acute fracture.  Upper abdomen: No acute findings in the upper abdomen.     Impression: No pulmonary embolus.   Severe emphysema with bronchitis and bronchopneumonia. Authenticated and Electronically Signed by Kirill Blanchard MD on 05/12/2024 09:21:27 PM   I have reviewed the patient's radiology reports.    Medication Review:     I have reviewed the patient's active and prn medications.     Problem List:      Acute hypoxic respiratory failure      Assessment/Plan:    Acute on chronic respiratory failure with hypoxia and hypercapnia  Bronchopneumonia  COPD exacerbation  Acute respiratory  failure due to pneumonia and AECOPD. Unfortunately poor baseline. Family having difficulty providing care he needs at home requesting rehab..  Rocephin and azithromycin.  Solu-Medrol pulmicort.  Bronchodilators.    Disposition: IPR in 2 to 3 days    Prophylaxis: Lovenox       Chronic:  COPD 3 L baseline, CKD stage III, BPH, hyperlipidemia, GERD, anemia     Edited by: Bobby Muse DO at 5/14/2024 0851     DVT Prophylaxis: lovenox  Code Status:   Code Status and Medical Interventions:   Ordered at: 05/13/24 0401     Code Status (Patient has no pulse and is not breathing):    CPR (Attempt to Resuscitate)     Medical Interventions (Patient has pulse or is breathing):    Full Support      Diet:   Dietary Orders (From admission, onward)       Start     Ordered    05/13/24 1756  Diet: Cardiac; Healthy Heart (2-3 Na+); Fluid Consistency: Thin (IDDSI 0)  Diet Effective Now        References:    Diet Order [place]   Question Answer Comment   Diets: Cardiac    Cardiac Diet: Healthy Heart (2-3 Na+)    Fluid Consistency: Thin (IDDSI 0)        05/13/24 1755                   Discharge Plan: IPR in 2 to 3 days    Bobby Muse DO  05/14/24  08:52 EDT    Dictated utilizing Dragon dictation.        Electronically signed by Bobby Muse DO at 05/14/24 0853       Bobby Muse DO at 05/13/24 1125          H and P reviewed. VSS. On home oxygen. Still dyspneic recieiving pneumonia and copd treatment. Per nursing family was looking to get assessed for rehab. Will continue inpatient management today.    Electronically signed by Bobby Muse DO at 05/13/24 1126          Physical Therapy Notes (all)        Doretha Cesar PT at 05/13/24 1248  Version 1 of 1         Goal Outcome Evaluation:  Plan of Care Reviewed With: patient        Progress: no change  Outcome Evaluation: Pt participated in PT initial evaluation this date. Pt presents supine in bed, pleasant and agreeable to PT evaluation. Pt denies reports  of pain at rest. Pt reports at baseline he lives at home with his wife and daughter in a H with 5 PUSHPA, (B) handrail present. Pt reports he ambulates household distances without AD, states sometimes he uses a cane. Pt denies reports of falls at home. Pt performed supine to sit on EOB with mod (I), STS transfer with CGA and ambulated x9' with RW and CGA. Following evaluation, pt left seated in bedside chair with call light and all needs within reach. Recommend skilled PT intervention during IP admission to address identified impairments, reduce risk of falls and prevent functional decline. Once medically stable, recommend pt to d/c home with support from family and HHPT to address remaining deficits.      Anticipated Discharge Disposition (PT): home with assist, home with home health                          Electronically signed by Doretha Cesar, PT at 24 1248       Doretha Cesar, PT at 24 1249  Version 1 of 1         Patient Name: Gregorio Yu  : 1940    MRN: [id number]                              Today's Date: 2024       Admit Date: 2024    Visit Dx:     ICD-10-CM ICD-9-CM   1. Bronchopneumonia  J18.0 485   2. Acute hypoxic respiratory failure  J96.01 518.81     Patient Active Problem List   Diagnosis    Acute hypoxic respiratory failure     Past Medical History:   Diagnosis Date    COPD (chronic obstructive pulmonary disease)      Past Surgical History:   Procedure Laterality Date    HERNIA REPAIR      TONSILLECTOMY        General Information       Row Name 24 1240          Physical Therapy Time and Intention    Document Type evaluation  -HW     Mode of Treatment physical therapy  -HW       Row Name 24 1240          General Information    Patient Profile Reviewed yes  -HW     Prior Level of Function independent:;all household mobility  -HW     Existing Precautions/Restrictions oxygen therapy device and L/min;fall  -HW     Barriers to Rehab medically complex;previous  functional deficit;hearing deficit  -       Row Name 05/13/24 1240          Living Environment    People in Home child(jules), adult;spouse  -       Row Name 05/13/24 1240          Home Main Entrance    Number of Stairs, Main Entrance five  -     Stair Railings, Main Entrance railings on both sides of stairs  -       Row Name 05/13/24 1240          Stairs Within Home, Primary    Number of Stairs, Within Home, Primary none  -       Row Name 05/13/24 1240          Cognition    Orientation Status (Cognition) oriented x 4  -       Row Name 05/13/24 1240          Safety Issues, Functional Mobility    Safety Issues Affecting Function (Mobility) safety precautions follow-through/compliance;safety precaution awareness  -     Impairments Affecting Function (Mobility) balance;endurance/activity tolerance;shortness of breath;strength  -               User Key  (r) = Recorded By, (t) = Taken By, (c) = Cosigned By      Initials Name Provider Type     Doretha Cesar PT Physical Therapist                   Mobility       Row Name 05/13/24 1240          Bed Mobility    Bed Mobility supine-sit  -     Supine-Sit [place] (Bed Mobility) modified independence  -     Assistive Device (Bed Mobility) bed rails;head of bed elevated  -       Row Name 05/13/24 1240          Sit-Stand Transfer    Sit-Stand [place] (Transfers) contact guard  -     Assistive Device (Sit-Stand Transfers) walker, front-wheeled  -[facility] Name 05/13/24 1240          Gait/Stairs (Locomotion)    [place] Level (Gait) contact guard  -     Assistive Device (Gait) walker, front-wheeled  -     Patient was able to Ambulate yes  -     Distance in Feet (Gait) 9  -     Deviations/Abnormal Patterns (Gait) bilateral deviations;festinating/shuffling;gait speed decreased;fariha decreased  -     Bilateral Gait Deviations forward flexed posture  -     [place] Level (Stairs) not tested  -               User Key  (r) =  Recorded By, (t) = Taken By, (c) = Cosigned By      Initials Name Provider Type     Doretha Cesar, PT Physical Therapist                   Obj/Interventions       Row Name 05/13/24 1241          Range of Motion Comprehensive    General Range of Motion bilateral lower extremity ROM WFL  -[facility] Name 05/13/24 1241          Strength Comprehensive (MMT)    General Manual Muscle Testing (MMT) Assessment lower extremity strength deficits identified  -     Comment, General Manual Muscle Testing (MMT) Assessment BLE MMT grossly 3+/5  -       Row Name 05/13/24 1241          Balance    Balance Assessment sitting static balance;sitting dynamic balance;sit to stand dynamic balance;standing static balance;standing dynamic balance  -     Static Sitting Balance standby assist  -     Dynamic Sitting Balance standby assist  -     Position, Sitting Balance unsupported;sitting edge of bed  -     Sit to Stand Dynamic Balance contact guard  -     Static Standing Balance contact guard  -     Dynamic Standing Balance contact guard  -     Position/Device Used, Standing Balance walker, front-wheeled;supported  -       Row Name 05/13/24 1241          Sensory Assessment (Somatosensory)    Sensory Assessment (Somatosensory) not tested  -               User Key  (r) = Recorded By, (t) = Taken By, (c) = Cosigned By      Initials Name Provider Type     Doretha Cesar, PT Physical Therapist                   Goals/Plan       Row Name 05/13/24 1246          Bed Mobility Goal 1 (PT)    Activity/Assistive Device (Bed Mobility Goal 1, PT) bed mobility activities, all  -     [place] Level/Cues Needed (Bed Mobility Goal 1, PT) independent  -     Time Frame (Bed Mobility Goal 1, PT) long term goal (LTG);10 days  -     Progress/Outcomes (Bed Mobility Goal 1, PT) new goal  -       Row Name 05/13/24 1246          Transfer Goal 1 (PT)    Activity/Assistive Device (Transfer Goal 1, PT) transfers, all;other (see  comments)  LRAD  -HW     [place] Level/Cues Needed (Transfer Goal 1, PT) modified independence  -HW     Time Frame (Transfer Goal 1, PT) long term goal (LTG);10 days  -HW     Progress/Outcome (Transfer Goal 1, PT) new goal  -       Row Name 05/13/24 1246          Gait Training Goal 1 (PT)    Activity/Assistive Device (Gait Training Goal 1, PT) gait (walking locomotion);other (see comments)  LRAD  -HW     [place] Level (Gait Training Goal 1, PT) modified independence  -HW     Distance (Gait Training Goal 1, PT) 50  -HW     Time Frame (Gait Training Goal 1, PT) long term goal (LTG);10 days  -HW     Progress/Outcome (Gait Training Goal 1, PT) new goal  -       Row Name 05/13/24 1246          Patient Education Goal (PT)    Activity (Patient Education Goal, PT) Pt will demonstrate the ability to perform 3x10 BLE ther-ex with mod (I) to allow for improved BLE strength and endurance  -HW     [place]/Cues/Accuracy (Memory Goal 2, PT) demonstrates adequately  -HW     Time Frame (Patient Education Goal, PT) short term goal (STG);5 days  -HW     Progress/Outcome (Patient Education Goal, PT) new goal  -       Row Name 05/13/24 1246          Therapy Assessment/Plan (PT)    Planned Therapy Interventions (PT) balance training;bed mobility training;gait training;home exercise program;neuromuscular re-education;postural re-education;transfer training;stretching;strengthening;stair training;patient/family education;lumbar stabilization;ROM (range of motion)  -               User Key  (r) = Recorded By, (t) = Taken By, (c) = Cosigned By      Initials Name Provider Type     Doretha Cesar, PT Physical Therapist                   Clinical Impression       Row Name 05/13/24 1242          Pain    Pretreatment Pain Rating 0/10 - no pain  -     Posttreatment Pain Rating 0/10 - no pain  -       Row Name 05/13/24 1242          Plan of Care Review    Plan of Care Reviewed With patient  -     Progress no change  -      Outcome Evaluation Pt participated in PT initial evaluation this date. Pt presents supine in bed, pleasant and agreeable to PT evaluation. Pt denies reports of pain at rest. Pt reports at baseline he lives at home with his wife and daughter in a SSH with 5 PUSHPA, (B) handrail present. Pt reports he ambulates household distances without AD, states sometimes he uses a cane. Pt denies reports of falls at home. Pt performed supine to sit on EOB with mod (I), STS transfer with CGA and ambulated x9' with RW and CGA. Following evaluation, pt left seated in bedside chair with call light and all needs within reach. Recommend skilled PT intervention during IP admission to address identified impairments, reduce risk of falls and prevent functional decline. Once medically stable, recommend pt to d/c home with support from family and HHPT to address remaining deficits.  -       Row Name 05/13/24 1242          Therapy Assessment/Plan (PT)    Patient/Family Therapy Goals Statement (PT) Pt reports he would like to return home at time of d/c  -     Rehab Potential (PT) good, to achieve stated therapy goals  -     Criteria for Skilled Interventions Met (PT) yes  -HW     Therapy Frequency (PT) 5 times/wk  -     Predicted Duration of Therapy Intervention (PT) 10 days  -       Row Name 05/13/24 1242          Vital Signs    Pre SpO2 (%) 97  -HW     O2 Delivery Pre Treatment supplemental O2  3L  -HW     Intra SpO2 (%) 89  -HW     O2 Delivery Intra Treatment supplemental O2  -HW     Post SpO2 (%) 92  -HW     O2 Delivery Post Treatment supplemental O2  -HW     Pre Patient Position Supine  -HW     Intra Patient Position Standing  -HW     Post Patient Position Sitting  -       Row Name 05/13/24 1242          Positioning and Restraints    Pre-Treatment Position in bed  -HW     Post Treatment Position chair  -HW     In Chair sitting;call light within reach;encouraged to call for assist  -HW               User Key  (r) = Recorded  By, (t) = Taken By, (c) = Cosigned By      Initials Name Provider Type     Doretha Cesar, PT Physical Therapist                   Outcome Measures       Row Name 05/13/24 1247 05/13/24 1020       How much help from another person do you currently need...    Turning from your back to your side while in flat bed without using bedrails? 3  - 3  -MC    Moving from lying on back to sitting on the side of a flat bed without bedrails? 3  - 3  -MC    Moving to and from a bed to a chair (including a wheelchair)? 3  - 3  -MC    Standing up from a chair using your arms (e.g., wheelchair, bedside chair)? 3  - 3  -MC    Climbing 3-5 steps with a railing? 3  - 2  -MC    To walk in hospital room? 3  - 2  -MC    AM-PAC 6 Clicks Score (PT) 18  - 16  -    Highest Level of Mobility Goal 6 --> Walk 10 steps or more  - 5 --> Static standing  -      Row Name 05/13/24 1247 05/13/24 1234       Functional Assessment    Outcome Measure Options AM-PAC 6 Clicks Basic Mobility (PT)  - AM-PAC 6 Clicks Daily Activity (OT)  -SD              User Key  (r) = Recorded By, (t) = Taken By, (c) = Cosigned By      Initials Name Provider Type    Renata Fulton, OT Occupational Therapist     Doretha Cesar, PT Physical Therapist    Mayda Mantilla, RN Registered Nurse                                 Physical Therapy Education       Title: PT OT SLP Therapies (In Progress)       Topic: Physical Therapy (In Progress)       Point: Mobility training (Done)       Learning Progress Summary             Patient Acceptance, E,TB, VU by  at 5/13/2024 1248    Comment: Educated pt on importance of OOB mobility during IP Admission                         Point: Home exercise program (Not Started)       Learner Progress:  Not documented in this visit.              Point: Body mechanics (Not Started)       Learner Progress:  Not documented in this visit.              Point: Precautions (Not Started)       Learner Progress:  Not  documented in this visit.                              User Key       Initials Effective Dates Name Provider Type Discipline     11/29/23 -  Doretha Cesar, RAFAEL Physical Therapist PT                  PT Recommendation and Plan  Planned Therapy Interventions (PT): balance training, bed mobility training, gait training, home exercise program, neuromuscular re-education, postural re-education, transfer training, stretching, strengthening, stair training, patient/family education, lumbar stabilization, ROM (range of motion)  Plan of Care Reviewed With: patient  Progress: no change  Outcome Evaluation: Pt participated in PT initial evaluation this date. Pt presents supine in bed, pleasant and agreeable to PT evaluation. Pt denies reports of pain at rest. Pt reports at baseline he lives at home with his wife and daughter in a H with 5 PUSHPA, (B) handrail present. Pt reports he ambulates household distances without AD, states sometimes he uses a cane. Pt denies reports of falls at home. Pt performed supine to sit on EOB with mod (I), STS transfer with CGA and ambulated x9' with RW and CGA. Following evaluation, pt left seated in bedside chair with call light and all needs within reach. Recommend skilled PT intervention during IP admission to address identified impairments, reduce risk of falls and prevent functional decline. Once medically stable, recommend pt to d/c home with support from family and HHPT to address remaining deficits.     Time Calculation:   PT Evaluation Complexity  History, PT Evaluation Complexity: 1-2 personal factors and/or comorbidities  Examination of Body Systems (PT Eval Complexity): total of 3 or more elements  Clinical Presentation (PT Evaluation Complexity): evolving  Clinical Decision Making (PT Evaluation Complexity): moderate complexity  Overall Complexity (PT Evaluation Complexity): moderate complexity     PT Charges       Row Name 05/13/24 0973             Time Calculation    Start Time  1037  -HW      PT Received On 24  -      PT Goal Re-Cert Due Date 24  -         Untimed Charges    PT Eval/Re-eval Minutes 41  -HW         Total Minutes    Untimed Charges Total Minutes 41  -HW       Total Minutes 41  -HW                User Key  (r) = Recorded By, (t) = Taken By, (c) = Cosigned By      Initials Name Provider Type     Doretha Cesar, PT Physical Therapist                  Therapy Charges for Today       Code Description Service Date Service Provider Modifiers Qty    35455433630 HC PT EVAL MOD COMPLEXITY 3 2024 Doretha Cesar, PT GP 1            PT G-Codes  Outcome Measure Options: AM-PAC 6 Clicks Basic Mobility (PT)  AM-PAC 6 Clicks Score (PT): 18  AM-PAC 6 Clicks Score (OT): 18  PT Discharge Summary  Anticipated Discharge Disposition (PT): home with assist, home with home health    Doretha Cesar, RAFAEL  2024      Electronically signed by Doretha Cesar, PT at 24 1249          Occupational Therapy Notes (all)        Renata Gupta, OT at 24 1236          Patient Name: Gregorio Yu  : 1940    MRN: [id number]                              Today's Date: 2024       Admit Date: 2024    Visit Dx:     ICD-10-CM ICD-9-CM   1. Bronchopneumonia  J18.0 485   2. Acute hypoxic respiratory failure  J96.01 518.81     Patient Active Problem List   Diagnosis    Acute hypoxic respiratory failure     Past Medical History:   Diagnosis Date    COPD (chronic obstructive pulmonary disease)      Past Surgical History:   Procedure Laterality Date    HERNIA REPAIR      TONSILLECTOMY        General Information       Row Name 24 1226          OT Time and Intention    Document Type evaluation  -SD     Mode of Treatment occupational therapy  -SD       Row Name 24 1226          General Information    Patient Profile Reviewed yes  -SD     Prior Level of Function independent:;all household mobility;ADL's  Patient lives with his wife and daughter. Mostly uses a SPC,  also has a rollator. Has a shower chair, [facility], wears 3L O2 at baseline.  -SD     Existing Precautions/Restrictions fall;oxygen therapy device and L/min  -SD     Barriers to Rehab medically complex;previous functional deficit;hearing deficit  -SD       Row Name 05/13/24 1226          Living Environment    People in Home spouse;child(jules), adult  -SD       Row Name 05/13/24 1226          Home Main Entrance    Number of Stairs, Main Entrance five  -SD     Stair Railings, Main Entrance railings on both sides of stairs  -SD       Row Name 05/13/24 1226          Stairs Within Home, Primary    Number of Stairs, Within Home, Primary none  -SD       Row Name 05/13/24 1226          Cognition    Orientation Status (Cognition) oriented x 4  -SD       Row Name 05/13/24 1226          Safety Issues, Functional Mobility    Safety Issues Affecting Function (Mobility) safety precautions follow-through/compliance;safety precaution awareness  -SD     Impairments Affecting Function (Mobility) balance;endurance/activity tolerance;shortness of breath;strength  -SD               User Key  (r) = Recorded By, (t) = Taken By, (c) = Cosigned By      Initials Name Provider Type    SD Renata Gupta, OT Occupational Therapist                     Mobility/ADL's       Row Name 05/13/24 1230          Bed Mobility    Bed Mobility supine-sit  -SD     Supine-Sit [place] (Bed Mobility) modified independence  -SD     Assistive Device (Bed Mobility) bed rails;head of bed elevated  -SD       Row Name 05/13/24 1230          Transfers    Transfers sit-stand transfer  -SD       Row Name 05/13/24 1230          Sit-Stand Transfer    Sit-Stand [place] (Transfers) contact guard  -SD     Assistive Device (Sit-Stand Transfers) walker, front-wheeled  -SD       Row Name 05/13/24 1230          Functional Mobility    Functional Mobility- Ind. Level contact guard assist  -SD     Functional Mobility- Device walker, front-wheeled  -SD     Functional  Mobility-Distance (Feet) 9  -SD     Functional Mobility- Safety Issues balance decreased during turns;sequencing ability decreased;supplemental O2  -SD       Row Name 05/13/24 1230          Activities of Daily Living    BADL Assessment/Intervention bathing;upper body dressing;lower body dressing;grooming;feeding;toileting  -SD       Row Name 05/13/24 1230          Bathing Assessment/Intervention    [place] Level (Bathing) minimum assist (75% patient effort)  -SD       Row Name 05/13/24 1230          Upper Body Dressing Assessment/Training    [place] Level (Upper Body Dressing) standby assist  -SD       Row Name 05/13/24 1230          Lower Body Dressing Assessment/Training    [place] Level (Lower Body Dressing) minimum assist (75% patient effort)  -SD       Row Name 05/13/24 1230          Grooming Assessment/Training    [place] Level (Grooming) standby assist  -SD       Row Name 05/13/24 1230          Self-Feeding Assessment/Training    [place] Level (Feeding) supervision  -SD       Row Name 05/13/24 1230          Toileting Assessment/Training    [place] Level (Toileting) minimum assist (75% patient effort)  -SD               User Key  (r) = Recorded By, (t) = Taken By, (c) = Cosigned By      Initials Name Provider Type    Renata Fulton OT Occupational Therapist                   Obj/Interventions       Row Name 05/13/24 1230          Range of Motion Comprehensive    General Range of Motion bilateral upper extremity ROM WFL  -SD       Row Name 05/13/24 1230          Strength Comprehensive (MMT)    General Manual Muscle Testing (MMT) Assessment upper extremity strength deficits identified  -SD     Comment, General Manual Muscle Testing (MMT) Assessment UB 3+/5 - 4-/5  -SD               User Key  (r) = Recorded By, (t) = Taken By, (c) = Cosigned By      Initials Name Provider Type    Renata Fulton OT Occupational Therapist                   Goals/Plan       Row Name 05/13/24  1234          Transfer Goal 1 (OT)    Activity/Assistive Device (Transfer Goal 1, OT) sit-to-stand/stand-to-sit;walker, rolling  -SD     [place] Level/Cues Needed (Transfer Goal 1, OT) modified independence  -SD     Time Frame (Transfer Goal 1, OT) long term goal (LTG)  -SD     Progress/Outcome (Transfer Goal 1, OT) goal ongoing  -SD       Row Name 05/13/24 1234          Dressing Goal 1 (OT)    Activity/Device (Dressing Goal 1, OT) lower body dressing  -SD     [place]/Cues Needed (Dressing Goal 1, OT) standby assist  -SD     Time Frame (Dressing Goal 1, OT) 2 weeks  -SD     Progress/Outcome (Dressing Goal 1, OT) goal ongoing  -SD       Row Name 05/13/24 1234          Toileting Goal 1 (OT)    Activity/Device (Toileting Goal 1, OT) toileting skills, all;commode, bedside without drop arms;raised toilet seat  -SD     [place] Level/Cues Needed (Toileting Goal 1, OT) standby assist  -SD     Time Frame (Toileting Goal 1, OT) 2 weeks  -SD     Progress/Outcome (Toileting Goal 1, OT) goal ongoing  -SD       [facility] Name 05/13/24 1234          Strength Goal 1 (OT)    Strength Goal 1 (OT) Patient to perform UB ther ex as tolerated  -SD     Time Frame (Strength Goal 1, OT) long term goal (LTG)  -SD     Progress/Outcome (Strength Goal 1, OT) goal ongoing  -SD       Row Name 05/13/24 1234          Therapy Assessment/Plan (OT)    Planned Therapy Interventions (OT) activity tolerance training;adaptive equipment training;BADL retraining;patient/caregiver education/training;ROM/therapeutic exercise;strengthening exercise;transfer/mobility retraining  -SD               User Key  (r) = Recorded By, (t) = Taken By, (c) = Cosigned By      Initials Name Provider Type    Renata Fulton OT Occupational Therapist                   Clinical Impression       Row Name 05/13/24 1231          Pain Assessment    Pretreatment Pain Rating 0/10 - no pain  -SD     Posttreatment Pain Rating 0/10 - no pain  -SD       Row Name 05/13/24  1231          Plan of Care Review    Plan of Care Reviewed With patient  -SD     Progress no change  -SD     Outcome Evaluation OT eval completed. Patient is supine in bed, Ox4, denies pain at rest. Patient lives with his wife and daughter, mainly uses a cane, also has a rollator, shower chair, BSC, wears 3L O2 at baseline. Patient performed supine to sit withg modified ind, sit to stand with CGA and walked 9' using RW with CGA, satting 89% following mobility. Patient requires min A overall for ADLs. Patient is expected to benefit from continued OT services prior to DC. Would benefit from HH services.  -SD       Row Name 05/13/24 1231          Therapy Assessment/Plan (OT)    Patient/Family Therapy Goal Statement (OT) home with HH  -SD     Rehab Potential (OT) fair, will monitor progress closely  -SD     Criteria for Skilled Therapeutic Interventions Met (OT) skilled treatment is necessary  -SD     Therapy Frequency (OT) 3 times/wk  5 times if indicated  -SD       Row Name 05/13/24 1231          Therapy Plan Review/Discharge Plan (OT)    Anticipated Discharge Disposition (OT) home with assist;home with home health  -SD       Row Name 05/13/24 1231          Vital Signs    Pre SpO2 (%) 97  -SD     O2 Delivery Pre Treatment supplemental O2  -SD     Intra SpO2 (%) 89  -SD     O2 Delivery Intra Treatment supplemental O2  -SD     Post SpO2 (%) 92  -SD     O2 Delivery Post Treatment supplemental O2  -SD       Row Name 05/13/24 1231          Positioning and Restraints    Pre-Treatment Position in bed  -SD     Post Treatment Position chair  -SD     In Chair sitting;call light within reach;encouraged to call for assist  -SD               User Key  (r) = Recorded By, (t) = Taken By, (c) = Cosigned By      Initials Name Provider Type    Renata Fulton OT Occupational Therapist                   Outcome Measures       Row Name 05/13/24 1234          How much help from another is currently needed...    Putting on and taking  off regular lower body clothing? 3  -SD     Bathing (including washing, rinsing, and drying) 3  -SD     Toileting (which includes using toilet bed pan or urinal) 3  -SD     Putting on and taking off regular upper body clothing 3  -SD     Taking care of personal grooming (such as brushing teeth) 3  -SD     Eating meals 3  -SD     AM-PAC 6 Clicks Score (OT) 18  -SD       Row Name 05/13/24 1020          How much help from another person do you currently need...    Turning from your back to your side while in flat bed without using bedrails? 3  -MC     Moving from lying on back to sitting on the side of a flat bed without bedrails? 3  -MC     Moving to and from a bed to a chair (including a wheelchair)? 3  -MC     Standing up from a chair using your arms (e.g., wheelchair, bedside chair)? 3  -MC     Climbing 3-5 steps with a railing? 2  -MC     To walk in hospital room? 2  -MC     AM-PAC 6 Clicks Score (PT) 16  -MC     Highest Level of Mobility Goal 5 --> Static standing  -MC       Row Name 05/13/24 1234          Functional Assessment    Outcome Measure Options AM-PAC 6 Clicks Daily Activity (OT)  -SD               User Key  (r) = Recorded By, (t) = Taken By, (c) = Cosigned By      Initials Name Provider Type    Renata Fulton OT Occupational Therapist    Mayda Mantilla, RN Registered Nurse                    Occupational Therapy Education       Title: PT OT SLP Therapies (In Progress)       Topic: Occupational Therapy (In Progress)       Point: ADL training (Done)       Description:   Instruct learner(s) on proper safety adaptation and remediation techniques during self care or transfers.   Instruct in proper use of assistive devices.                  Learning Progress Summary             Patient Acceptance, E,TB, VU by SD at 5/13/2024 1235    Comment: OT POC                         Point: Home exercise program (Not Started)       Description:   Instruct learner(s) on appropriate technique for monitoring,  assisting and/or progressing therapeutic exercises/activities.                  Learner Progress:  Not documented in this visit.              Point: Precautions (Not Started)       Description:   Instruct learner(s) on prescribed precautions during self-care and functional transfers.                  Learner Progress:  Not documented in this visit.              Point: Body mechanics (Not Started)       Description:   Instruct learner(s) on proper positioning and spine alignment during self-care, functional mobility activities and/or exercises.                  Learner Progress:  Not documented in this visit.                              User Key       Initials Effective Dates Name Provider Type Discipline    SD 06/16/21 -  Renata Gupta OT Occupational Therapist OT                  OT Recommendation and Plan  Planned Therapy Interventions (OT): activity tolerance training, adaptive equipment training, BADL retraining, patient/caregiver education/training, ROM/therapeutic exercise, strengthening exercise, transfer/mobility retraining  Therapy Frequency (OT): 3 times/wk (5 times if indicated)  Plan of Care Review  Plan of Care Reviewed With: patient  Progress: no change  Outcome Evaluation: OT eval completed. Patient is supine in bed, Ox4, denies pain at rest. Patient lives with his wife and daughter, mainly uses a cane, also has a rollator, shower chair, BSC, wears 3L O2 at baseline. Patient performed supine to sit withg modified ind, sit to stand with CGA and walked 9' using RW with CGA, satting 89% following mobility. Patient requires min A overall for ADLs. Patient is expected to benefit from continued OT services prior to DC. Would benefit from HH services.     Time Calculation:   Evaluation Complexity (OT)  Review Occupational Profile/Medical/Therapy History Complexity: brief/low complexity  Assessment, Occupational Performance/Identification of Deficit Complexity: 1-3 performance deficits  Clinical Decision  Making Complexity (OT): problem focused assessment/low complexity  Overall Complexity of Evaluation (OT): low complexity     Time Calculation- OT       Row Name 05/13/24 1235             Time Calculation- OT    OT Start Time 1036  -SD      OT Received On 05/13/24  -SD      OT Goal Re-Cert Due Date 05/23/24  -SD         Untimed Charges    OT Eval/Re-eval Minutes 45  -SD         Total Minutes    Untimed Charges Total Minutes 45  -SD       Total Minutes 45  -SD                User Key  (r) = Recorded By, (t) = Taken By, (c) = Cosigned By      Initials Name Provider Type    SD Renata Gupta OT Occupational Therapist                  Therapy Charges for Today       Code Description Service Date Service Provider Modifiers Qty    33769975118 HC OT EVAL LOW COMPLEXITY 3 5/13/2024 Renata Gupta OT GO 1                 Renata Gupta OT  5/13/2024    Electronically signed by Renata Gupta OT at 05/13/24 1236       Renata Gupta OT at 05/13/24 1235          Goal Outcome Evaluation:  Plan of Care Reviewed With: patient        Progress: no change  Outcome Evaluation: OT eval completed. Patient is supine in bed, Ox4, denies pain at rest. Patient lives with his wife and daughter, mainly uses a cane, also has a rollator, shower chair, BSC, wears 3L O2 at baseline. Patient performed supine to sit withg modified ind, sit to stand with CGA and walked 9' using RW with CGA, satting 89% following mobility. Patient requires min A overall for ADLs. Patient is expected to benefit from continued OT services prior to DC. Would benefit from HH services.      Anticipated Discharge Disposition (OT): home with assist, home with home health                          Electronically signed by Rneata Gupta OT at 05/13/24 1236

## 2024-05-15 VITALS
RESPIRATION RATE: 18 BRPM | WEIGHT: 113.1 LBS | TEMPERATURE: 98.4 F | DIASTOLIC BLOOD PRESSURE: 83 MMHG | HEART RATE: 94 BPM | SYSTOLIC BLOOD PRESSURE: 155 MMHG | BODY MASS INDEX: 17.75 KG/M2 | OXYGEN SATURATION: 97 % | HEIGHT: 67 IN

## 2024-05-15 LAB
ANION GAP SERPL CALCULATED.3IONS-SCNC: 2.7 MMOL/L (ref 5–15)
BASOPHILS # BLD AUTO: 0.01 10*3/MM3 (ref 0–0.2)
BASOPHILS NFR BLD AUTO: 0.1 % (ref 0–1.5)
BUN SERPL-MCNC: 43 MG/DL (ref 8–23)
BUN/CREAT SERPL: 33.1 (ref 7–25)
CALCIUM SPEC-SCNC: 9.1 MG/DL (ref 8.6–10.5)
CHLORIDE SERPL-SCNC: 100 MMOL/L (ref 98–107)
CO2 SERPL-SCNC: 33.3 MMOL/L (ref 22–29)
CREAT SERPL-MCNC: 1.3 MG/DL (ref 0.76–1.27)
DEPRECATED RDW RBC AUTO: 45.6 FL (ref 37–54)
EGFRCR SERPLBLD CKD-EPI 2021: 53.5 ML/MIN/1.73
EOSINOPHIL # BLD AUTO: 0 10*3/MM3 (ref 0–0.4)
EOSINOPHIL NFR BLD AUTO: 0 % (ref 0.3–6.2)
ERYTHROCYTE [DISTWIDTH] IN BLOOD BY AUTOMATED COUNT: 12.4 % (ref 12.3–15.4)
GLUCOSE SERPL-MCNC: 124 MG/DL (ref 65–99)
HCT VFR BLD AUTO: 33.1 % (ref 37.5–51)
HGB BLD-MCNC: 10.4 G/DL (ref 13–17.7)
IMM GRANULOCYTES # BLD AUTO: 0.05 10*3/MM3 (ref 0–0.05)
IMM GRANULOCYTES NFR BLD AUTO: 0.7 % (ref 0–0.5)
LYMPHOCYTES # BLD AUTO: 0.61 10*3/MM3 (ref 0.7–3.1)
LYMPHOCYTES NFR BLD AUTO: 8.1 % (ref 19.6–45.3)
MCH RBC QN AUTO: 31.2 PG (ref 26.6–33)
MCHC RBC AUTO-ENTMCNC: 31.4 G/DL (ref 31.5–35.7)
MCV RBC AUTO: 99.4 FL (ref 79–97)
MONOCYTES # BLD AUTO: 0.42 10*3/MM3 (ref 0.1–0.9)
MONOCYTES NFR BLD AUTO: 5.6 % (ref 5–12)
NEUTROPHILS NFR BLD AUTO: 6.42 10*3/MM3 (ref 1.7–7)
NEUTROPHILS NFR BLD AUTO: 85.5 % (ref 42.7–76)
NRBC BLD AUTO-RTO: 0 /100 WBC (ref 0–0.2)
PLATELET # BLD AUTO: 239 10*3/MM3 (ref 140–450)
PMV BLD AUTO: 8.6 FL (ref 6–12)
POTASSIUM SERPL-SCNC: 5 MMOL/L (ref 3.5–5.2)
RBC # BLD AUTO: 3.33 10*6/MM3 (ref 4.14–5.8)
SODIUM SERPL-SCNC: 136 MMOL/L (ref 136–145)
WBC NRBC COR # BLD AUTO: 7.51 10*3/MM3 (ref 3.4–10.8)

## 2024-05-15 PROCEDURE — 85025 COMPLETE CBC W/AUTO DIFF WBC: CPT | Performed by: STUDENT IN AN ORGANIZED HEALTH CARE EDUCATION/TRAINING PROGRAM

## 2024-05-15 PROCEDURE — 94799 UNLISTED PULMONARY SVC/PX: CPT

## 2024-05-15 PROCEDURE — 94761 N-INVAS EAR/PLS OXIMETRY MLT: CPT

## 2024-05-15 PROCEDURE — 25010000002 ENOXAPARIN PER 10 MG: Performed by: STUDENT IN AN ORGANIZED HEALTH CARE EDUCATION/TRAINING PROGRAM

## 2024-05-15 PROCEDURE — 99239 HOSP IP/OBS DSCHRG MGMT >30: CPT | Performed by: FAMILY MEDICINE

## 2024-05-15 PROCEDURE — 25010000002 METHYLPREDNISOLONE PER 40 MG: Performed by: STUDENT IN AN ORGANIZED HEALTH CARE EDUCATION/TRAINING PROGRAM

## 2024-05-15 PROCEDURE — 25010000002 AZITHROMYCIN PER 500 MG: Performed by: STUDENT IN AN ORGANIZED HEALTH CARE EDUCATION/TRAINING PROGRAM

## 2024-05-15 PROCEDURE — 80048 BASIC METABOLIC PNL TOTAL CA: CPT | Performed by: STUDENT IN AN ORGANIZED HEALTH CARE EDUCATION/TRAINING PROGRAM

## 2024-05-15 PROCEDURE — 25010000002 CEFTRIAXONE PER 250 MG: Performed by: INTERNAL MEDICINE

## 2024-05-15 PROCEDURE — 25810000003 SODIUM CHLORIDE 0.9 % SOLUTION: Performed by: STUDENT IN AN ORGANIZED HEALTH CARE EDUCATION/TRAINING PROGRAM

## 2024-05-15 RX ORDER — IPRATROPIUM BROMIDE AND ALBUTEROL SULFATE 2.5; .5 MG/3ML; MG/3ML
3 SOLUTION RESPIRATORY (INHALATION)
Start: 2024-05-15

## 2024-05-15 RX ADMIN — METHYLPREDNISOLONE SODIUM SUCCINATE 40 MG: 40 INJECTION, POWDER, FOR SOLUTION INTRAMUSCULAR; INTRAVENOUS at 06:32

## 2024-05-15 RX ADMIN — SODIUM CHLORIDE 500 MG: 900 INJECTION, SOLUTION INTRAVENOUS at 04:06

## 2024-05-15 RX ADMIN — IPRATROPIUM BROMIDE AND ALBUTEROL SULFATE 3 ML: .5; 3 SOLUTION RESPIRATORY (INHALATION) at 13:27

## 2024-05-15 RX ADMIN — Medication 10 ML: at 08:30

## 2024-05-15 RX ADMIN — CEFTRIAXONE SODIUM 1000 MG: 1 INJECTION, POWDER, FOR SOLUTION INTRAMUSCULAR; INTRAVENOUS at 06:32

## 2024-05-15 RX ADMIN — ENOXAPARIN SODIUM 40 MG: 100 INJECTION SUBCUTANEOUS at 08:30

## 2024-05-15 NOTE — DISCHARGE SUMMARY
"    [facility]      Name:  Gregorio Yu   Age:  84 y.o.  Sex:  male  :  1940  MRN:  [id number]   Visit Number:  [id number]    Admission Date:  2024  Date of Discharge:  5/15/2024  Primary Care Physician:  Xu Santos MD    Important issues to note:    Continue bronchodilators, steroids, oxygen upon discharge to rehab.    Discharge Diagnoses:       Acute hypoxic respiratory failure        Problem List:     Active Hospital Problems    Diagnosis  POA    **Acute hypoxic respiratory failure [J96.01]  Yes      Resolved Hospital Problems   No resolved problems to display.     Presenting Problem:    Chief Complaint   Patient presents with    Shortness of Breath      Consults:     Consulting Physician(s)                     None                History Of Presenting Illness:     Per Dr. Muse  \"Gregorio Yu is an 84-year-old man with past medical history of COPD 3 L baseline, CKD stage III, BPH, hyperlipidemia, GERD, anemia.  Presented to [facility] ED 2024 with concern for shortness of air for several days.  He denied fevers or chills, chest pain, cough, abdominal pain, N/V/D.     ED summary: Tmax 99.7, tachycardic which improved, otherwise vital signs stable on 6 L.  ABG CO2 retention pH 7.36, pCO2 59, pO2 91, bicarb 34.  EKG sinus tachycardia rate 116, nonspecific ST-T wave changes.  Troponin 16, 16.  proBNP not elevated.  Blood glucose 130.  Lactate not elevated.  Procalcitonin not elevated.  No leukocytosis.  Hemoglobin 10.  CT angio chest no PE, does show severe emphysema with bronchitis and bronchopneumonia.  He was provided albuterol, Atrovent, Solu-Medrol.  Edited by: Bobby Muse DO at 2024 0848\"      Hospital Course:     Per Dr. Muse  \"Acute on chronic respiratory failure with hypoxia and hypercapnia  Bronchopneumonia  COPD exacerbation  Acute respiratory failure due to pneumonia and AECOPD. Unfortunately poor baseline. Family " "having difficulty providing care he needs at home requesting rehab. We initially tried to set this up for him. However patient adamantly refuses rehab. Appears to have medical decision making capacity understands risks and benefits of decisions he is making. Was given Rocephin transition to omnicef 3 more days and azithromycin rx given for one more day.  Solu-Medrol transition to prednisone taper. [facility] High bounceback risk unfortunately.    Chronic:  COPD 3 L baseline, CKD stage III, BPH, hyperlipidemia, GERD, anemia     Edited by: Bobby Muse,  at 5/14/2024 0851\"    Addendum: After talking with patient and family, decided on short term rehab. Approved for [facility]. Will discharge today. He has no acute complaints.       Vital Signs:    Temp:  [97.8 °F (36.6 °C)-98.6 °F (37 °C)] 98.4 °F (36.9 °C)  Heart Rate:  [79-90] 82  Resp:  [16-22] 16  BP: (115-155)/(54-87) 155/83    Physical Exam:    Constitutional:       General: He is not in acute distress.     Appearance: He is chronically ill-appearing. He is not toxic-appearing.   HENT:      Head: Normocephalic.      Mouth/Throat:      Mouth: Mucous membranes are moist.   Eyes:      Extraocular Movements: Extraocular movements intact.   Cardiovascular:      Rate and Rhythm: Normal rate and regular rhythm.      Pulses: Normal pulses.      Heart sounds: Normal heart sounds.   Pulmonary:      Effort: Pulmonary effort is mildly labored.      Comments: Scattered rhonchi  Abdominal:      Palpations: Abdomen is soft.      Tenderness: There is no abdominal tenderness.   Musculoskeletal:      Right lower leg: No edema.      Left lower leg: No edema.   Skin:     General: Skin is warm.      Capillary Refill: Capillary refill takes less than 2 seconds.   Neurological:      General: No focal deficit present.      Mental Status: He is alert.   Psychiatric:         Mood and Affect: Mood normal.         Thought Content: Thought content normal. "     Pertinent Lab Results:     Results from last 7 days   Lab Units 05/15/24  0811 05/14/24  0710 05/13/24  0230 05/12/24  1828   SODIUM mmol/L 136 141 138 139   POTASSIUM mmol/L 5.0 5.4* 4.8 4.5   CHLORIDE mmol/L 100 102 98 98   CO2 mmol/L 33.3* 35.1* 31.4* 31.0*   BUN mg/dL 43* 43* 26* 25*   CREATININE mg/dL 1.30* 1.39* 1.17 1.24   CALCIUM mg/dL 9.1 8.8 8.9 9.1   BILIRUBIN mg/dL  --   --   --  0.4   ALK PHOS U/L  --   --   --  75   ALT (SGPT) U/L  --   --   --  20   AST (SGOT) U/L  --   --   --  18   GLUCOSE mg/dL 124* 136* 169* 130*     Results from last 7 days   Lab Units 05/15/24  0811 05/14/24  0710 05/13/24  0230   WBC 10*3/mm3 7.51 8.12 7.72   HEMOGLOBIN g/dL 10.4* 9.8* 9.9*   HEMATOCRIT % 33.1* 31.5* 31.2*   PLATELETS 10*3/mm3 239 219 182         Results from last 7 days   Lab Units 05/12/24  2241 05/12/24  1828   HSTROP T ng/L 16 16  16     Results from last 7 days   Lab Units 05/12/24  1828   PROBNP pg/mL 322.3             Results from last 7 days   Lab Units 05/12/24 2017   PH, ARTERIAL pH units 7.367   PO2 ART mm Hg 91.4   PCO2, ARTERIAL mm Hg 59.4*   HCO3 ART mmol/L 34.1*     Results from last 7 days   Lab Units 05/13/24  0234 05/13/24  0230   BLOODCX  No growth at 2 days No growth at 2 days       Pertinent Radiology Results:    Imaging Results (All)       Procedure Component Value Units Date/Time    XR Chest 1 View [[id number]] Collected: 05/13/24 0821     Updated: 05/13/24 0841    Narrative:      PROCEDURE: XR CHEST 1 VW-     HISTORY: [facility] Triage Protocol     COMPARISON: 08/20/2023     FINDINGS:  Portable view of the chest demonstrates patchy airspace  disease right lung base compatible with pneumonia. Left lung is clear.  There is no evidence of effusion, pneumothorax or other significant  pleural disease. The mediastinum is unremarkable.     The heart size is normal.       Impression:      Right basilar pneumonia.           This report was signed and finalized on 5/13/2024 8:39 AM by Luiz Marina,  MD.       CT Angiogram Chest Pulmonary Embolism [[id number]] Collected: 05/12/24 1940     Updated: 05/12/24 2122    Narrative:      FINAL REPORT    TECHNIQUE:  Multiple axial CT images were obtained through the chest  following IV contrast using a CTA/PE protocol.  3D/MIP  reconstruction images were also performed. This study was  performed with techniques to keep radiation doses as low as  reasonably achievable (ALARA). Individualized dose reduction  techniques using automated exposure control or adjustment of mA  and/or kV according to the patient's size were employed.    CLINICAL HISTORY:  shortness of breath with tachycardia, concern for PE    FINDINGS:  PAs and aorta: No pulmonary embolus.  Thoracic aorta is  unremarkable.  There is coronary artery disease.  Heart/mediastinum: No evidence for right heart strain.  No  pericardial effusion.  The heart is normal in size.  Lungs:  There is severe emphysema. There is bronchial wall thickening  with peripheral mucous plugging and tree-in-bud nodularity in  the right middle lobe and right lower lobe.  Lymph nodes: No  pathologically enlarged thoracic lymph nodes.  Pleura: No  pneumothorax or pleural effusion.  Chest Wall: No chest wall  contusion.  Bones: No acute fracture.  Upper abdomen: No acute  findings in the upper abdomen.      Impression:      No pulmonary embolus.   Severe emphysema with bronchitis and  bronchopneumonia.    Authenticated and Electronically Signed by Kirill Blanchard MD on  05/12/2024 09:21:27 PM            Echo:      Condition on Discharge:      Stable.    Code status during the hospital stay:    Code Status and Medical Interventions:   Ordered at: 05/13/24 0401     Code Status (Patient has no pulse and is not breathing):    CPR (Attempt to Resuscitate)     Medical Interventions (Patient has pulse or is breathing):    Full Support     Discharge Disposition:    Skilled Nursing Facility (DC - External)    Discharge Medications:       Discharge  Medications        New Medications        Instructions Start Date   Advair Diskus 250-50 MCG/ACT DISKUS  Generic drug: Fluticasone-Salmeterol   1 puff, Inhalation, 2 Times Daily - RT      albuterol sulfate  (90 Base) MCG/ACT inhaler  Commonly known as: PROVENTIL HFA;VENTOLIN HFA;PROAIR HFA   2 puffs, Inhalation, Every 4 Hours PRN      cefdinir 300 MG capsule  Commonly known as: OMNICEF   300 mg, Oral, 2 Times Daily      ipratropium-albuterol 0.5-2.5 mg/3 ml nebulizer  Commonly known as: DUO-NEB   3 mL, Nebulization, Every 6 Hours - RT      predniSONE 10 MG tablet  Commonly known as: DELTASONE   Take 4 tablets by mouth Daily for 3 days, THEN 3 tablets Daily for 3 days, THEN 2 tablets Daily for 3 days, THEN 1 tablet Daily for 3 days.   Start Date: May 14, 2024     Spiriva Respimat 2.5 MCG/ACT aerosol solution inhaler  Generic drug: tiotropium bromide monohydrate   2 puffs, Inhalation, Daily, 2 inh once a day             Stop These Medications      doxycycline 100 MG capsule  Commonly known as: MONODOX            Discharge Diet:     Diet Instructions       Advance Diet As Tolerated -Target Diet: cardiac      Target Diet: cardiac          Activity at Discharge:   As tolerated    Follow-up Appointments:    Additional Instructions for the Follow-ups that You Need to Schedule       Ambulatory Referral to Disease State Management   As directed      To dept:  [facility] [[id number]]   What program(s) are you referring for?: COPD   Follow-up needed: Yes        Discharge Follow-up with PCP   As directed       Currently Documented PCP:    Xu Santos MD    PCP Phone Number:    [phone]     Follow Up Details: 1 week               Contact information for follow-up providers       Xu Santos MD .    Specialty: Internal Medicine  Why: 1 week  Contact information:  [place] [address] [phone]                       Contact information for after-discharge care       Destination        [facility] .    Service: Skilled Nursing  Contact information:  [address] [address] [phone]                                 Future Appointments   Date Time Provider Department Center   6/4/2024  1:00 PM Jeannie Ojeda APRN  [facility]     Test Results Pending at Discharge:    Pending Labs       Order Current Status    Blood Culture - Blood, Hand, Left Preliminary result    Blood Culture - Blood, Hand, Right Preliminary result               Cynthia Che DO  05/15/24  12:34 EDT    Time: I spent 32 minutes on this discharge activity which included: face-to-face encounter with the patient, reviewing the data in the system, coordination of the care with the nursing staff as well as consultants, documentation, and entering orders.     Dictated utilizing Dragon dictation.

## 2024-05-15 NOTE — CASE MANAGEMENT/SOCIAL WORK
Case Management Discharge Note      Final Note: Plans to DC home with his wife and tayloretr    Provided Post Acute Provider List?: N/A  N/A Provider List Comment: Patient plans to return home; no new DME needs  Provided Post Acute Provider Quality & Resource List?: N/A  N/A Quality & Resource List Comment: Patient plans to return home; no new DME needs    Selected Continued Care - Admitted Since 5/12/2024       Destination Coordination complete.      Service Provider Selected Services Address Phone Fax Patient Preferred    [facility] [address] [address] [phone] --              Durable Medical Equipment    No services have been selected for the patient.                Dialysis/Infusion    No services have been selected for the patient.                Home Medical Care    No services have been selected for the patient.                Therapy    No services have been selected for the patient.                Community Resources    No services have been selected for the patient.                Community & DME    No services have been selected for the patient.                    Transportation Services  Ambulance: [facility]    Final Discharge Disposition Code: 03 - skilled nursing facility (SNF)

## 2024-05-15 NOTE — CASE MANAGEMENT/SOCIAL WORK
Pt very [place]. Cm had long discussion with pt about going home w  versus [facility]. Pt was ambivalent initially but eventually agreeable to rehab. DANIEL Whitley present for discussion. Mag SW f/u with [facility] and verified he can admit today. Cm attempted to call wife at number listed [phone] and number was disconnected. Pt states he does not know her number.     1323 Spoke with daughter, Hortencia, [phone] to advise her pt is being discharged to [facility] today. Contact number for Facility provided. Contacts updated in pt chart. IMM signed by pt

## 2024-05-18 LAB
BACTERIA SPEC AEROBE CULT: NORMAL
BACTERIA SPEC AEROBE CULT: NORMAL

## 2024-05-20 ENCOUNTER — NURSING HOME (OUTPATIENT)
Dept: FAMILY MEDICINE CLINIC | Facility: CLINIC | Age: 84
End: 2024-05-20
Payer: MEDICARE

## 2024-05-20 VITALS
OXYGEN SATURATION: 96 % | SYSTOLIC BLOOD PRESSURE: 138 MMHG | TEMPERATURE: 97.7 F | DIASTOLIC BLOOD PRESSURE: 67 MMHG | HEART RATE: 76 BPM | RESPIRATION RATE: 18 BRPM

## 2024-05-20 DIAGNOSIS — Z74.09 IMPAIRED MOBILITY AND ADLS: ICD-10-CM

## 2024-05-20 DIAGNOSIS — Z87.09 HISTORY OF ACUTE RESPIRATORY FAILURE: Primary | ICD-10-CM

## 2024-05-20 DIAGNOSIS — J44.9 CHRONIC OBSTRUCTIVE PULMONARY DISEASE, UNSPECIFIED COPD TYPE: ICD-10-CM

## 2024-05-20 DIAGNOSIS — Z78.9 IMPAIRED MOBILITY AND ADLS: ICD-10-CM

## 2024-05-20 NOTE — LETTER
Nursing Home Follow Up Note      Gage Brunson DO []   [facility] [x]  [address] [place] [address]  Phone: [phone]  Fax: [phone] Shasha Inman MD []    Rojelio Martinez DO []   [address] [place] [address]  Phone: [phone]  Fax: [phone]     PATIENT NAME: Gregorio Yu                                                                          YOB: 1940           DATE OF SERVICE: 05/20/2024  FACILITY:  [x][place]   [] [place]   [] [facility]   [] [facility]   [] Other ______________________________________________________________________      CHIEF COMPLAINT:    Discharging home 5/21.      HISTORY OF PRESENT ILLNESS:     84-year-old man with past medical history of COPD 3 L baseline, CKD stage III, BPH, hyperlipidemia, GERD, anemia. He initially presented to [facility] ED 5/12/2024 with concern for shortness of air for several days. He denied fevers or chills, chest pain, cough, abdominal pain, N/V/D.     He was diagnosed with acute respiratory failure due to pneumonia and COPD exacerbation. Family was having difficulty providing care and requested rehab. Was given Rocephin transition to omnicef 3 more days and azithromycin rx given for one more day.  Solu-Medrol transition to prednisone taper. He was admitted to facility for short term rehabilitation.     PAST MEDICAL & SURGICAL HISTORY:   Past Medical History:   Diagnosis Date    COPD (chronic obstructive pulmonary disease)       Past Surgical History:   Procedure Laterality Date    HERNIA REPAIR      TONSILLECTOMY           MEDICATIONS:  I have reviewed and reconciled the patients medication list in the patients chart at the skilled nursing facility today.      ALLERGIES:    No Known Allergies      SOCIAL HISTORY:      Social History     Socioeconomic History    Marital status:    Tobacco Use    Smoking status: Former     Current packs/day: 0.00     Types: Cigarettes     Quit date: 2009      Years since quitting: 15.3     Passive exposure: Past   Vaping Use    Vaping status: Never Used   Substance and Sexual Activity    Alcohol use: No    Drug use: No    Sexual activity: Defer       FAMILY HISTORY:    No family history on file.    REVIEW OF SYSTEMS:    Review of Systems   Constitutional:  Negative for activity change, appetite change, fatigue, fever, unexpected weight gain and unexpected weight loss.   HENT:  Negative for congestion, mouth sores, nosebleeds and sore throat.    Eyes:  Negative for visual disturbance.   Respiratory:  Negative for apnea, cough, chest tightness, shortness of breath and wheezing.    Cardiovascular: Negative.    Gastrointestinal:  Negative for abdominal pain, constipation, diarrhea, nausea, vomiting and indigestion.   Genitourinary:  Negative for decreased urine volume, dysuria, frequency and urinary incontinence.   Musculoskeletal:  Positive for arthralgias (chronic).   Skin:  Negative for color change, pallor and rash.   Neurological:  Positive for weakness and memory problem. Negative for dizziness and confusion.   Psychiatric/Behavioral:  Negative for dysphoric mood, sleep disturbance and depressed mood. The patient is not nervous/anxious.          PHYSICAL EXAMINATION:   VITAL SIGNS:   Vitals:    05/20/24 1146   BP: 138/67   Pulse: 76   Resp: 18   Temp: 97.7 °F (36.5 °C)   SpO2: 96%        Physical Exam  Vitals and nursing note reviewed.   Constitutional:       Appearance: Normal appearance.   Cardiovascular:      Rate and Rhythm: Normal rate and regular rhythm.   Musculoskeletal:      Comments: LE weakness   Neurological:      Mental Status: He is alert and oriented to person, place, and time.   Psychiatric:         Mood and Affect: Mood and affect normal.         Speech: Speech normal.         Behavior: Behavior normal.         Cognition and Memory: Cognition and memory normal.         RECORDS REVIEW:   I have reviewed records in [facility]    ASSESSMENT     Diagnoses and all  orders for this visit:    1. History of acute respiratory failure (Primary)    2. Chronic obstructive pulmonary disease, unspecified COPD type    3. Impaired mobility and ADLs        PLAN    History respiratory failure/COPD  -Stable at this time with no increased work of breathing or 02 demand. He will continue to follow up with PCP.    Nursing encouraged to keep me informed of any acute changes, lack of improvement, or any new concerning symptoms.     Continue supportive care for all ADLs.     Will follow up and continue to monitor.     [x]  Discussed Patient in detail with nursing/staff, addressed all needs today.     []  Plan of Care Reviewed   []  PT/OT Reviewed   [x]  Order Changes  [x]  Discharge Plans Reviewed  [x]  Advance Directive on file with Nursing Home.   [x]  POA on file with Nursing Home.   [x]  Code Status listed: []  Full Code   []  DNR          “I confirm accuracy of unchanged data/findings which have been carried forward from previous visit, as well as I have updated appropriately those that have changed.”     I spent 40 minutes on discharge for Gregorio on this date of service. This time includes time spent by me in the following activities:preparing for the visit, reviewing tests, obtaining and/or reviewing a separately obtained history, performing a medically appropriate examination and/or evaluation , counseling and educating the patient/family/caregiver, ordering medications, tests, or procedures, referring and communicating with other health care professionals , documenting information in the medical record, independently interpreting results and communicating that information with the patient/family/caregiver, and care coordination     Jeannie Falcon, APRN.

## 2024-05-22 NOTE — PROGRESS NOTES
Nursing Home Follow Up Note      Gage Brunson DO []   [facility] [x]  [address] [place] [address]  Phone: [phone]  Fax: [phone] Shasha Inman MD []    Rojelio Martinez DO []   [address] [place] [address]  Phone: [phone]  Fax: [phone]     PATIENT NAME: Gregorio Yu                                                                          YOB: 1940           DATE OF SERVICE: 05/20/2024  FACILITY:  [x][facility]   [] [place]   [] [facility]   [] [facility]   [] Other ______________________________________________________________________      CHIEF COMPLAINT:    Discharging home 5/21.      HISTORY OF PRESENT ILLNESS:     84-year-old man with past medical history of COPD 3 L baseline, CKD stage III, BPH, hyperlipidemia, GERD, anemia. He initially presented to [facility] ED 5/12/2024 with concern for shortness of air for several days. He denied fevers or chills, chest pain, cough, abdominal pain, N/V/D.     He was diagnosed with acute respiratory failure due to pneumonia and COPD exacerbation. Family was having difficulty providing care and requested rehab. Was given Rocephin transition to omnicef 3 more days and azithromycin rx given for one more day.  Solu-Medrol transition to prednisone taper. He was admitted to facility for short term rehabilitation.     PAST MEDICAL & SURGICAL HISTORY:   Past Medical History:   Diagnosis Date    COPD (chronic obstructive pulmonary disease)       Past Surgical History:   Procedure Laterality Date    HERNIA REPAIR      TONSILLECTOMY           MEDICATIONS:  I have reviewed and reconciled the patients medication list in the patients chart at the skilled nursing facility today.      ALLERGIES:    No Known Allergies      SOCIAL HISTORY:      Social History     Socioeconomic History    Marital status:    Tobacco Use    Smoking status: Former     Current packs/day: 0.00     Types: Cigarettes     Quit date: 2009      Years since quitting: 15.3     Passive exposure: Past   Vaping Use    Vaping status: Never Used   Substance and Sexual Activity    Alcohol use: No    Drug use: No    Sexual activity: Defer       FAMILY HISTORY:    No family history on file.    REVIEW OF SYSTEMS:    Review of Systems   Constitutional:  Negative for activity change, appetite change, fatigue, fever, unexpected weight gain and unexpected weight loss.   HENT:  Negative for congestion, mouth sores, nosebleeds and sore throat.    Eyes:  Negative for visual disturbance.   Respiratory:  Negative for apnea, cough, chest tightness, shortness of breath and wheezing.    Cardiovascular: Negative.    Gastrointestinal:  Negative for abdominal pain, constipation, diarrhea, nausea, vomiting and indigestion.   Genitourinary:  Negative for decreased urine volume, dysuria, frequency and urinary incontinence.   Musculoskeletal:  Positive for arthralgias (chronic).   Skin:  Negative for color change, pallor and rash.   Neurological:  Positive for weakness and memory problem. Negative for dizziness and confusion.   Psychiatric/Behavioral:  Negative for dysphoric mood, sleep disturbance and depressed mood. The patient is not nervous/anxious.          PHYSICAL EXAMINATION:   VITAL SIGNS:   Vitals:    05/20/24 1146   BP: 138/67   Pulse: 76   Resp: 18   Temp: 97.7 °F (36.5 °C)   SpO2: 96%        Physical Exam  Vitals and nursing note reviewed.   Constitutional:       Appearance: Normal appearance.   Cardiovascular:      Rate and Rhythm: Normal rate and regular rhythm.   Musculoskeletal:      Comments: LE weakness   Neurological:      Mental Status: He is alert and oriented to person, place, and time.   Psychiatric:         Mood and Affect: Mood and affect normal.         Speech: Speech normal.         Behavior: Behavior normal.         Cognition and Memory: Cognition and memory normal.         RECORDS REVIEW:   I have reviewed records in [facility]    ASSESSMENT     Diagnoses and all  orders for this visit:    1. History of acute respiratory failure (Primary)    2. Chronic obstructive pulmonary disease, unspecified COPD type    3. Impaired mobility and ADLs        PLAN    History respiratory failure/COPD  -Stable at this time with no increased work of breathing or 02 demand. He will continue to follow up with PCP.    Nursing encouraged to keep me informed of any acute changes, lack of improvement, or any new concerning symptoms.     Continue supportive care for all ADLs.     Will follow up and continue to monitor.     [x]  Discussed Patient in detail with nursing/staff, addressed all needs today.     []  Plan of Care Reviewed   []  PT/OT Reviewed   [x]  Order Changes  [x]  Discharge Plans Reviewed  [x]  Advance Directive on file with Nursing Home.   [x]  POA on file with Nursing Home.   [x]  Code Status listed: []  Full Code   []  DNR          “I confirm accuracy of unchanged data/findings which have been carried forward from previous visit, as well as I have updated appropriately those that have changed.”     I spent 40 minutes on discharge for Gregorio on this date of service. This time includes time spent by me in the following activities:preparing for the visit, reviewing tests, obtaining and/or reviewing a separately obtained history, performing a medically appropriate examination and/or evaluation , counseling and educating the patient/family/caregiver, ordering medications, tests, or procedures, referring and communicating with other health care professionals , documenting information in the medical record, independently interpreting results and communicating that information with the patient/family/caregiver, and care coordination     Jeannie Falcon, APRN.

## 2024-05-30 ENCOUNTER — HOSPITAL ENCOUNTER (EMERGENCY)
Facility: HOSPITAL | Age: 84
Discharge: HOME | End: 2024-05-30
Attending: EMERGENCY MEDICINE
Payer: OTHER GOVERNMENT

## 2024-05-30 ENCOUNTER — APPOINTMENT (OUTPATIENT)
Dept: GENERAL RADIOLOGY | Facility: HOSPITAL | Age: 84
End: 2024-05-30
Payer: OTHER GOVERNMENT

## 2024-05-30 VITALS
WEIGHT: 120 LBS | OXYGEN SATURATION: 98 % | SYSTOLIC BLOOD PRESSURE: 139 MMHG | TEMPERATURE: 97.2 F | HEART RATE: 87 BPM | HEIGHT: 66 IN | RESPIRATION RATE: 18 BRPM | BODY MASS INDEX: 19.29 KG/M2 | DIASTOLIC BLOOD PRESSURE: 74 MMHG

## 2024-05-30 DIAGNOSIS — J18.9 PNEUMONIA DUE TO INFECTIOUS ORGANISM, UNSPECIFIED LATERALITY, UNSPECIFIED PART OF LUNG: Primary | ICD-10-CM

## 2024-05-30 LAB
ALBUMIN SERPL-MCNC: 3.8 G/DL (ref 3.5–5.2)
ALBUMIN/GLOB SERPL: 1.2 G/DL
ALP SERPL-CCNC: 73 U/L (ref 39–117)
ALT SERPL W P-5'-P-CCNC: 15 U/L (ref 1–41)
ANION GAP SERPL CALCULATED.3IONS-SCNC: 7.9 MMOL/L (ref 5–15)
AST SERPL-CCNC: 16 U/L (ref 1–40)
BASOPHILS # BLD AUTO: 0.07 10*3/MM3 (ref 0–0.2)
BASOPHILS NFR BLD AUTO: 1.1 % (ref 0–1.5)
BILIRUB SERPL-MCNC: 0.3 MG/DL (ref 0–1.2)
BUN SERPL-MCNC: 17 MG/DL (ref 8–23)
BUN/CREAT SERPL: 13.7 (ref 7–25)
CALCIUM SPEC-SCNC: 9.2 MG/DL (ref 8.6–10.5)
CHLORIDE SERPL-SCNC: 96 MMOL/L (ref 98–107)
CO2 SERPL-SCNC: 34.1 MMOL/L (ref 22–29)
CREAT SERPL-MCNC: 1.24 MG/DL (ref 0.76–1.27)
D-LACTATE SERPL-SCNC: 1 MMOL/L (ref 0.5–2)
DEPRECATED RDW RBC AUTO: 46 FL (ref 37–54)
EGFRCR SERPLBLD CKD-EPI 2021: 56.6 ML/MIN/1.73
EOSINOPHIL # BLD AUTO: 0.28 10*3/MM3 (ref 0–0.4)
EOSINOPHIL NFR BLD AUTO: 4.5 % (ref 0.3–6.2)
ERYTHROCYTE [DISTWIDTH] IN BLOOD BY AUTOMATED COUNT: 13 % (ref 12.3–15.4)
GLOBULIN UR ELPH-MCNC: 3.1 GM/DL
GLUCOSE SERPL-MCNC: 107 MG/DL (ref 65–99)
HCT VFR BLD AUTO: 34.1 % (ref 37.5–51)
HGB BLD-MCNC: 11 G/DL (ref 13–17.7)
HOLD SPECIMEN: NORMAL
HOLD SPECIMEN: NORMAL
IMM GRANULOCYTES # BLD AUTO: 0.04 10*3/MM3 (ref 0–0.05)
IMM GRANULOCYTES NFR BLD AUTO: 0.6 % (ref 0–0.5)
LYMPHOCYTES # BLD AUTO: 1.15 10*3/MM3 (ref 0.7–3.1)
LYMPHOCYTES NFR BLD AUTO: 18.6 % (ref 19.6–45.3)
MCH RBC QN AUTO: 31.4 PG (ref 26.6–33)
MCHC RBC AUTO-ENTMCNC: 32.3 G/DL (ref 31.5–35.7)
MCV RBC AUTO: 97.4 FL (ref 79–97)
MONOCYTES # BLD AUTO: 0.43 10*3/MM3 (ref 0.1–0.9)
MONOCYTES NFR BLD AUTO: 7 % (ref 5–12)
NEUTROPHILS NFR BLD AUTO: 4.21 10*3/MM3 (ref 1.7–7)
NEUTROPHILS NFR BLD AUTO: 68.2 % (ref 42.7–76)
NRBC BLD AUTO-RTO: 0 /100 WBC (ref 0–0.2)
NT-PROBNP SERPL-MCNC: 198.8 PG/ML (ref 0–1800)
PLATELET # BLD AUTO: 223 10*3/MM3 (ref 140–450)
PMV BLD AUTO: 8.4 FL (ref 6–12)
POTASSIUM SERPL-SCNC: 4.5 MMOL/L (ref 3.5–5.2)
PROCALCITONIN SERPL-MCNC: 0.07 NG/ML (ref 0–0.25)
PROT SERPL-MCNC: 6.9 G/DL (ref 6–8.5)
RBC # BLD AUTO: 3.5 10*6/MM3 (ref 4.14–5.8)
SODIUM SERPL-SCNC: 138 MMOL/L (ref 136–145)
TROPONIN T SERPL HS-MCNC: 18 NG/L
WBC NRBC COR # BLD AUTO: 6.18 10*3/MM3 (ref 3.4–10.8)
WHOLE BLOOD HOLD COAG: NORMAL
WHOLE BLOOD HOLD SPECIMEN: NORMAL

## 2024-05-30 PROCEDURE — 71045 X-RAY EXAM CHEST 1 VIEW: CPT

## 2024-05-30 PROCEDURE — 96374 THER/PROPH/DIAG INJ IV PUSH: CPT

## 2024-05-30 PROCEDURE — 85025 COMPLETE CBC W/AUTO DIFF WBC: CPT | Performed by: EMERGENCY MEDICINE

## 2024-05-30 PROCEDURE — 80053 COMPREHEN METABOLIC PANEL: CPT

## 2024-05-30 PROCEDURE — 93005 ELECTROCARDIOGRAM TRACING: CPT | Performed by: EMERGENCY MEDICINE

## 2024-05-30 PROCEDURE — 84484 ASSAY OF TROPONIN QUANT: CPT | Performed by: EMERGENCY MEDICINE

## 2024-05-30 PROCEDURE — 94640 AIRWAY INHALATION TREATMENT: CPT

## 2024-05-30 PROCEDURE — 83880 ASSAY OF NATRIURETIC PEPTIDE: CPT | Performed by: EMERGENCY MEDICINE

## 2024-05-30 PROCEDURE — 83605 ASSAY OF LACTIC ACID: CPT | Performed by: NURSE PRACTITIONER

## 2024-05-30 PROCEDURE — 93005 ELECTROCARDIOGRAM TRACING: CPT

## 2024-05-30 PROCEDURE — 25010000002 DEXAMETHASONE SODIUM PHOSPHATE 10 MG/ML SOLUTION: Performed by: NURSE PRACTITIONER

## 2024-05-30 PROCEDURE — 94761 N-INVAS EAR/PLS OXIMETRY MLT: CPT

## 2024-05-30 PROCEDURE — 99284 EMERGENCY DEPT VISIT MOD MDM: CPT

## 2024-05-30 PROCEDURE — 84145 PROCALCITONIN (PCT): CPT | Performed by: NURSE PRACTITIONER

## 2024-05-30 PROCEDURE — 94799 UNLISTED PULMONARY SVC/PX: CPT

## 2024-05-30 RX ORDER — IPRATROPIUM BROMIDE AND ALBUTEROL SULFATE 2.5; .5 MG/3ML; MG/3ML
3 SOLUTION RESPIRATORY (INHALATION) ONCE
Status: COMPLETED | OUTPATIENT
Start: 2024-05-30 | End: 2024-05-30

## 2024-05-30 RX ORDER — SODIUM CHLORIDE 0.9 % (FLUSH) 0.9 %
10 SYRINGE (ML) INJECTION AS NEEDED
Status: DISCONTINUED | OUTPATIENT
Start: 2024-05-30 | End: 2024-05-30 | Stop reason: HOSPADM

## 2024-05-30 RX ORDER — AZITHROMYCIN 250 MG/1
TABLET, FILM COATED ORAL
Qty: 6 TABLET | Refills: 0 | Status: SHIPPED | OUTPATIENT
Start: 2024-05-30

## 2024-05-30 RX ORDER — DEXAMETHASONE SODIUM PHOSPHATE 10 MG/ML
10 INJECTION, SOLUTION INTRAMUSCULAR; INTRAVENOUS ONCE
Status: COMPLETED | OUTPATIENT
Start: 2024-05-30 | End: 2024-05-30

## 2024-05-30 RX ORDER — PREDNISONE 5 MG/1
1 TABLET ORAL DAILY
Qty: 48 TABLET | Refills: 0 | Status: SHIPPED | OUTPATIENT
Start: 2024-05-30

## 2024-05-30 RX ORDER — AMOXICILLIN AND CLAVULANATE POTASSIUM 875; 125 MG/1; MG/1
1 TABLET, FILM COATED ORAL 2 TIMES DAILY
Qty: 14 TABLET | Refills: 0 | Status: SHIPPED | OUTPATIENT
Start: 2024-05-30 | End: 2024-06-06

## 2024-05-30 RX ADMIN — IPRATROPIUM BROMIDE AND ALBUTEROL SULFATE 3 ML: .5; 3 SOLUTION RESPIRATORY (INHALATION) at 11:33

## 2024-05-30 RX ADMIN — DEXAMETHASONE SODIUM PHOSPHATE 10 MG: 10 INJECTION INTRAMUSCULAR; INTRAVENOUS at 11:30

## 2024-05-30 NOTE — ED PROVIDER NOTES
"Subjective:  History of Present Illness:    Patient is a 84-year-old male with history of COPD.  Patient reports that he was recently admitted to the hospital 2 weeks ago with pneumonia was treated with antibiotics while inpatient.  Discharged to [place] for rehabilitation services.  Reports that he was discharged 4 days ago.  Reports over the last 24 hours I did shortness of breath his become somewhat worse.  Patient denies fever.  Denies chest pain.  Denies OTC medication home remedy.  Denies leaving exacerbating factors.    Nurses Notes reviewed and agree, including vitals, allergies, social history and prior medical history.     REVIEW OF SYSTEMS: All systems reviewed and not pertinent unless noted.  Review of Systems   Respiratory:  Positive for shortness of breath.    All other systems reviewed and are negative.      Past Medical History:   Diagnosis Date    COPD (chronic obstructive pulmonary disease)        Allergies:    Patient has no known allergies.      Past Surgical History:   Procedure Laterality Date    HERNIA REPAIR      TONSILLECTOMY           Social History     Socioeconomic History    Marital status:    Tobacco Use    Smoking status: Former     Current packs/day: 0.00     Types: Cigarettes     Quit date: 2009     Years since quitting: 15.4     Passive exposure: Past   Vaping Use    Vaping status: Never Used   Substance and Sexual Activity    Alcohol use: No    Drug use: No    Sexual activity: Defer         History reviewed. No pertinent family history.    Objective  Physical Exam:  /68   Pulse 82   Temp 97.2 °F (36.2 °C) (Axillary)   Resp 18   Ht 167.6 cm (66\")   Wt 54.4 kg (120 lb)   SpO2 100%   BMI 19.37 kg/m²      Physical Exam  Vitals and nursing note reviewed.   Constitutional:       Appearance: He is well-developed and normal weight.   HENT:      Head: Normocephalic and atraumatic.      Mouth/Throat:      Mouth: Mucous membranes are moist.      Pharynx: Oropharynx is " clear.   Eyes:      Extraocular Movements: Extraocular movements intact.      Pupils: Pupils are equal, round, and reactive to light.   Cardiovascular:      Rate and Rhythm: Normal rate and regular rhythm.   Pulmonary:      Effort: Pulmonary effort is normal.      Breath sounds: Normal breath sounds.   Abdominal:      General: Bowel sounds are normal.      Palpations: Abdomen is soft.   Musculoskeletal:         General: Normal range of motion.      Cervical back: Normal range of motion and neck supple.   Skin:     General: Skin is warm and dry.      Capillary Refill: Capillary refill takes less than 2 seconds.   Neurological:      General: No focal deficit present.      Mental Status: He is alert and oriented to person, place, and time.   Psychiatric:         Mood and Affect: Mood normal.         Behavior: Behavior normal.         Procedures    ED Course:         Lab Results (last 24 hours)       Procedure Component Value Units Date/Time    CBC & Differential [[id number]]  (Abnormal) Collected: 05/30/24 1043    Specimen: Blood Updated: 05/30/24 1054    Narrative:      The following orders were created for panel order CBC & Differential.  Procedure                               Abnormality         Status                     ---------                               -----------         ------                     CBC Auto Differential[[id number]]        Abnormal            Final result                 Please view results for these tests on the individual orders.    Comprehensive Metabolic Panel [[id number]]  (Abnormal) Collected: 05/30/24 1043    Specimen: Blood Updated: 05/30/24 1109     Glucose 107 mg/dL      BUN 17 mg/dL      Creatinine 1.24 mg/dL      Sodium 138 mmol/L      Potassium 4.5 mmol/L      Chloride 96 mmol/L      CO2 34.1 mmol/L      Calcium 9.2 mg/dL      Total Protein 6.9 g/dL      Albumin 3.8 g/dL      ALT (SGPT) 15 U/L      AST (SGOT) 16 U/L      Alkaline Phosphatase 73 U/L      Total Bilirubin 0.3 mg/dL       Globulin 3.1 gm/dL      A/G Ratio 1.2 g/dL      BUN/Creatinine Ratio 13.7     Anion Gap 7.9 mmol/L      eGFR 56.6 mL/min/1.73     Narrative:      GFR Normal >60  Chronic Kidney Disease <60  Kidney Failure <15    The GFR formula is only valid for adults with stable renal function between ages 18 and 70.    BNP [[id number]]  (Normal) Collected: 05/30/24 1043    Specimen: Blood Updated: 05/30/24 1112     proBNP 198.8 pg/mL     Narrative:      This assay is used as an aid in the diagnosis of individuals suspected of having heart failure. It can be used as an aid in the diagnosis of acute decompensated heart failure (ADHF) in patients presenting with signs and symptoms of ADHF to the emergency department (ED). In addition, NT-proBNP of <300 pg/mL indicates ADHF is not likely.    Age Range Result Interpretation  NT-proBNP Concentration (pg/mL:      <50             Positive            >450                   Gray                 300-450                    Negative             <300    50-75           Positive            >900                  Gray                300-900                  Negative            <300      >75             Positive            >1800                  Gray                300-1800                  Negative            <300    Single High Sensitivity Troponin T [[id number]]  (Normal) Collected: 05/30/24 1043    Specimen: Blood Updated: 05/30/24 1112     HS Troponin T 18 ng/L     Narrative:      High Sensitive Troponin T Reference Range:  <14.0 ng/L- Negative Female for AMI  <22.0 ng/L- Negative Male for AMI  >=14 - Abnormal Female indicating possible myocardial injury.  >=22 - Abnormal Male indicating possible myocardial injury.   Clinicians would have to utilize clinical acumen, EKG, Troponin, and serial changes to determine if it is an Acute Myocardial Infarction or myocardial injury due to an underlying chronic condition.         CBC Auto Differential [[id number]]  (Abnormal) Collected: 05/30/24 1043  "   Specimen: Blood Updated: 05/30/24 1054     WBC 6.18 10*3/mm3      RBC 3.50 10*6/mm3      Hemoglobin 11.0 g/dL      Hematocrit 34.1 %      MCV 97.4 fL      MCH 31.4 pg      MCHC 32.3 g/dL      RDW 13.0 %      RDW-SD 46.0 fl      MPV 8.4 fL      Platelets 223 10*3/mm3      Neutrophil % 68.2 %      Lymphocyte % 18.6 %      Monocyte % 7.0 %      Eosinophil % 4.5 %      Basophil % 1.1 %      Immature Grans % 0.6 %      Neutrophils, Absolute 4.21 10*3/mm3      Lymphocytes, Absolute 1.15 10*3/mm3      Monocytes, Absolute 0.43 10*3/mm3      Eosinophils, Absolute 0.28 10*3/mm3      Basophils, Absolute 0.07 10*3/mm3      Immature Grans, Absolute 0.04 10*3/mm3      nRBC 0.0 /100 WBC     Procalcitonin [[id number]]  (Normal) Collected: 05/30/24 1043    Specimen: Blood Updated: 05/30/24 1208     Procalcitonin 0.07 ng/mL     Narrative:      As a Marker for Sepsis (Non-Neonates):    1. <0.5 ng/mL represents a low risk of severe sepsis and/or septic shock.  2. >2 ng/mL represents a high risk of severe sepsis and/or septic shock.    As a Marker for Lower Respiratory Tract Infections that require antibiotic therapy:    PCT on Admission    Antibiotic Therapy       6-12 Hrs later    >0.5                Strongly Recommended  >0.25 - <0.5        Recommended   0.1 - 0.25          Discouraged              Remeasure/reassess PCT  <0.1                Strongly Discouraged     Remeasure/reassess PCT    As 28 day mortality risk marker: \"Change in Procalcitonin Result\" (>80% or <=80%) if Day 0 (or Day 1) and Day 4 values are available. Refer to http://www.[email or web address]-pct-calculator.com    Change in PCT <=80%  A decrease of PCT levels below or equal to 80% defines a positive change in PCT test result representing a higher risk for 28-day all-cause mortality of patients diagnosed with severe sepsis for septic shock.    Change in PCT >80%  A decrease of PCT levels of more than 80% defines a negative change in PCT result representing a lower risk for " 28-day all-cause mortality of patients diagnosed with severe sepsis or septic shock.       Lactic Acid, Plasma [[id number]]  (Normal) Collected: 05/30/24 1043    Specimen: Blood Updated: 05/30/24 1137     Lactate 1.0 mmol/L              XR Chest 1 View    Result Date: 5/30/2024  PROCEDURE: XR CHEST 1 VW-  HISTORY: [facility] Triage Protocol  COMPARISON: May 12, 2024.  FINDINGS: The heart is normal in size. There are emphysematous changes bilaterally. There are right basilar patchy airspace disease as almost completely cleared. There is improved aeration of the left lung base as well. Aortic mural calcifications noted..The mediastinum is unremarkable. There is no pneumothorax.  There are no acute osseous abnormalities. There is evidence of old calcified granulomatous disease.      Impression: Interval clearing of bilateral pneumonia compared to prior..  Emphysematous change similar to prior.    This report was signed and finalized on 5/30/2024 11:13 AM by Estella Taylor MD.          [facility]      Initial impression of presenting illness: Patient is a 84-year-old male with history of COPD.  Patient reports that he was recently admitted to the hospital 2 weeks ago with pneumonia was treated with antibiotics while inpatient.  Discharged to [place] for rehabilitation services.  Reports that he was discharged 4 days ago.  Reports over the last 24 hours I did shortness of breath his become somewhat worse.  Patient denies fever.  Denies chest pain.  Denies OTC medication home remedy.  Denies leaving exacerbating factors.    DDX: includes but is not limited to: Pneumonia, COPD, respiratory virus or other    Patient arrives stable with vitals interpreted by myself.     Pertinent features from physical exam: Lung sounds are clear bilaterally throughout.  Ab soft nontender.  Bowel sounds normal.  Heart sounds normal..    Initial diagnostic plan: CBC, CMP, procalcitonin, lactate, BMP, troponin, chest x-ray, EKG    Results from initial plan  were reviewed and interpreted by me revealing CBC is within separable parameters with mild anemia.  CMP is within appropriate range.  Troponin procalcitonin lactic and proBNP were all negative.  EKG is sinus rhythm at 79.  Chest x-ray with following impression interval clearing of bilateral pneumonia compared to prior.  Emphysematous changes similar to prior    Diagnostic information from other sources: Chart review    Interventions / Re-evaluation: Vital signs stable throughout encounter.  Patient received DuoNeb.  10 mg dexamethasone.  Reports he feels better.    Results/clinical rationale were discussed with patient daughter    Consultations/Discussion of results with other physicians: N/A    Disposition plan: Patient is hemodynamically stable nontoxic-appearing appropriate discharge.  Will send patient home on azithromycin Augmentin.  Will send home with prednisone Dosepak.  Have patient follow-up PCP within the week.  Follow-up ER for new or worse symptoms.  -----        Final diagnoses:   Pneumonia due to infectious organism, unspecified laterality, unspecified part of lung          Ranulfo Mcknight, APRN  05/30/24 1246

## 2024-05-30 NOTE — DISCHARGE INSTRUCTIONS
Follow-up with your PCP within the week.  Follow-up ER for new or worse symptoms.  Have called in prescription for azithromycin Augmentin and prednisone.